# Patient Record
Sex: FEMALE | Race: BLACK OR AFRICAN AMERICAN | NOT HISPANIC OR LATINO | URBAN - METROPOLITAN AREA
[De-identification: names, ages, dates, MRNs, and addresses within clinical notes are randomized per-mention and may not be internally consistent; named-entity substitution may affect disease eponyms.]

---

## 2022-10-31 ENCOUNTER — EMERGENCY (EMERGENCY)
Facility: HOSPITAL | Age: 52
LOS: 0 days | Discharge: ANOTHER HOSPITAL | End: 2022-11-01
Attending: STUDENT IN AN ORGANIZED HEALTH CARE EDUCATION/TRAINING PROGRAM

## 2022-10-31 VITALS
HEART RATE: 106 BPM | HEIGHT: 65 IN | WEIGHT: 138.89 LBS | OXYGEN SATURATION: 98 % | TEMPERATURE: 98 F | DIASTOLIC BLOOD PRESSURE: 89 MMHG | SYSTOLIC BLOOD PRESSURE: 129 MMHG | RESPIRATION RATE: 18 BRPM

## 2022-10-31 DIAGNOSIS — D64.9 ANEMIA, UNSPECIFIED: ICD-10-CM

## 2022-10-31 DIAGNOSIS — F25.0 SCHIZOAFFECTIVE DISORDER, BIPOLAR TYPE: ICD-10-CM

## 2022-10-31 DIAGNOSIS — F22 DELUSIONAL DISORDERS: ICD-10-CM

## 2022-10-31 DIAGNOSIS — Z20.822 CONTACT WITH AND (SUSPECTED) EXPOSURE TO COVID-19: ICD-10-CM

## 2022-10-31 LAB
ANION GAP SERPL CALC-SCNC: 8 MMOL/L — SIGNIFICANT CHANGE UP (ref 5–17)
APAP SERPL-MCNC: < 2 UG/ML (ref 10–30)
BASOPHILS # BLD AUTO: 0.03 K/UL — SIGNIFICANT CHANGE UP (ref 0–0.2)
BASOPHILS NFR BLD AUTO: 0.6 % — SIGNIFICANT CHANGE UP (ref 0–2)
BUN SERPL-MCNC: 13 MG/DL — SIGNIFICANT CHANGE UP (ref 7–23)
CALCIUM SERPL-MCNC: 8.5 MG/DL — SIGNIFICANT CHANGE UP (ref 8.5–10.1)
CHLORIDE SERPL-SCNC: 105 MMOL/L — SIGNIFICANT CHANGE UP (ref 96–108)
CO2 SERPL-SCNC: 26 MMOL/L — SIGNIFICANT CHANGE UP (ref 22–31)
CREAT SERPL-MCNC: 0.88 MG/DL — SIGNIFICANT CHANGE UP (ref 0.5–1.3)
EGFR: 80 ML/MIN/1.73M2 — SIGNIFICANT CHANGE UP
EOSINOPHIL # BLD AUTO: 0.11 K/UL — SIGNIFICANT CHANGE UP (ref 0–0.5)
EOSINOPHIL NFR BLD AUTO: 2.1 % — SIGNIFICANT CHANGE UP (ref 0–6)
ETHANOL SERPL-MCNC: <10 MG/DL — SIGNIFICANT CHANGE UP (ref 0–10)
FLUAV AG NPH QL: SIGNIFICANT CHANGE UP
FLUBV AG NPH QL: SIGNIFICANT CHANGE UP
GLUCOSE SERPL-MCNC: 88 MG/DL — SIGNIFICANT CHANGE UP (ref 70–99)
HCG SERPL-ACNC: <1 MIU/ML — SIGNIFICANT CHANGE UP
HCT VFR BLD CALC: 30.4 % — LOW (ref 34.5–45)
HGB BLD-MCNC: 9.6 G/DL — LOW (ref 11.5–15.5)
IMM GRANULOCYTES NFR BLD AUTO: 0.2 % — SIGNIFICANT CHANGE UP (ref 0–0.9)
LYMPHOCYTES # BLD AUTO: 1.37 K/UL — SIGNIFICANT CHANGE UP (ref 1–3.3)
LYMPHOCYTES # BLD AUTO: 26.4 % — SIGNIFICANT CHANGE UP (ref 13–44)
MCHC RBC-ENTMCNC: 28 PG — SIGNIFICANT CHANGE UP (ref 27–34)
MCHC RBC-ENTMCNC: 31.6 G/DL — LOW (ref 32–36)
MCV RBC AUTO: 88.6 FL — SIGNIFICANT CHANGE UP (ref 80–100)
MONOCYTES # BLD AUTO: 0.41 K/UL — SIGNIFICANT CHANGE UP (ref 0–0.9)
MONOCYTES NFR BLD AUTO: 7.9 % — SIGNIFICANT CHANGE UP (ref 2–14)
NEUTROPHILS # BLD AUTO: 3.26 K/UL — SIGNIFICANT CHANGE UP (ref 1.8–7.4)
NEUTROPHILS NFR BLD AUTO: 62.8 % — SIGNIFICANT CHANGE UP (ref 43–77)
NRBC # BLD: 0 /100 WBCS — SIGNIFICANT CHANGE UP (ref 0–0)
PLATELET # BLD AUTO: 324 K/UL — SIGNIFICANT CHANGE UP (ref 150–400)
POTASSIUM SERPL-MCNC: 3.4 MMOL/L — LOW (ref 3.5–5.3)
POTASSIUM SERPL-SCNC: 3.4 MMOL/L — LOW (ref 3.5–5.3)
RBC # BLD: 3.43 M/UL — LOW (ref 3.8–5.2)
RBC # FLD: 14.1 % — SIGNIFICANT CHANGE UP (ref 10.3–14.5)
SALICYLATES SERPL-MCNC: <1.7 MG/DL — LOW (ref 2.8–20)
SARS-COV-2 RNA SPEC QL NAA+PROBE: SIGNIFICANT CHANGE UP
SODIUM SERPL-SCNC: 139 MMOL/L — SIGNIFICANT CHANGE UP (ref 135–145)
WBC # BLD: 5.19 K/UL — SIGNIFICANT CHANGE UP (ref 3.8–10.5)
WBC # FLD AUTO: 5.19 K/UL — SIGNIFICANT CHANGE UP (ref 3.8–10.5)

## 2022-10-31 PROCEDURE — 90792 PSYCH DIAG EVAL W/MED SRVCS: CPT | Mod: 95

## 2022-10-31 PROCEDURE — 93010 ELECTROCARDIOGRAM REPORT: CPT

## 2022-10-31 PROCEDURE — 99285 EMERGENCY DEPT VISIT HI MDM: CPT

## 2022-10-31 PROCEDURE — 70450 CT HEAD/BRAIN W/O DYE: CPT | Mod: 26,MA

## 2022-10-31 RX ORDER — HALOPERIDOL DECANOATE 100 MG/ML
5 INJECTION INTRAMUSCULAR ONCE
Refills: 0 | Status: COMPLETED | OUTPATIENT
Start: 2022-10-31 | End: 2022-10-31

## 2022-10-31 RX ADMIN — HALOPERIDOL DECANOATE 5 MILLIGRAM(S): 100 INJECTION INTRAMUSCULAR at 16:50

## 2022-10-31 RX ADMIN — Medication 2 MILLIGRAM(S): at 16:51

## 2022-10-31 NOTE — ED ADULT NURSE REASSESSMENT NOTE - NS ED NURSE REASSESS COMMENT FT1
pt refusing to have blood work done at this time, MD Cortez made aware, pt continues to be one to one, safety precautions are in place, nursing care continues

## 2022-10-31 NOTE — ED ADULT NURSE NOTE - ED STAT RN HANDOFF DETAILS
Report endorsed to oncoming RNJana. Safety checks completed this shift. Safety rounds completed hourly.  IV sites checked Q2+remains WDL. Medications administered as ordered with no s/s of adverse rxns. Fall & skin precautions in place. Any issues endorsed to oncoming RN for follow up.

## 2022-10-31 NOTE — ED PROVIDER NOTE - PHYSICAL EXAMINATION
Gen: NAD, AOx3, able to make needs known, non-toxic  Head: NC, approx 3 cm diameter chronic wound to R frontal w/o drainage or surrounding erythema  HEENT: EOMI, oral mucosa moist, normal conjunctiva  Lung: CTAB, no respiratory distress, no wheezes/rhonchi/rales B/L, speaking in full sentences  CV: RRR, no murmurs  Abd: non distended, soft, nontender, no guarding,  MSK: no visible deformities  Neuro: Appears non focal  Skin: Warm, well perfused  Psych: poor eye contact, flight of ideas, tangential speech, disheveled, intermittently yelling

## 2022-10-31 NOTE — ED ADULT NURSE REASSESSMENT NOTE - NS ED NURSE REASSESS COMMENT FT1
Pt sleeping comfortably in bed, easy to arouse, AAOx4 when awake. No complaints at this time. Respirations equal and unlabored. No acute distress noted at this time. Awaiting consult with telepsych at this time.

## 2022-10-31 NOTE — ED ADULT TRIAGE NOTE - NS ED NURSE BANDS TYPE
Pharyngitis in Children   WHAT YOU NEED TO KNOW:   Pharyngitis, or sore throat, is inflammation of the tissues and structures in your child's pharynx (throat)  Pharyngitis may be caused by a bacterial or viral infection  DISCHARGE INSTRUCTIONS:   Seek care immediately if:   Your child suddenly has trouble breathing or turns blue  Your child has swelling or pain in his or her jaw  Your child has voice changes, or it is hard to understand his or her speech  Your child has a stiff neck  Your child is urinating less than usual or has fewer diapers than usual      Your child has increased weakness or fatigue  Your child has pain on one side of the throat that is much worse than the other side  Contact your child's healthcare provider if:   Your child's symptoms return or his symptoms do not get better or get worse  Your child has a rash  He or she may also have reddish cheeks and a red, swollen tongue  Your child has new ear pain, headaches, or pain around his or her eyes  Your child pauses in breathing when he or she sleeps  You have questions or concerns about your child's condition or care  Medicines: Your child may need any of the following:  Acetaminophen  decreases pain  It is available without a doctor's order  Ask how much to give your child and how often to give it  Follow directions  Acetaminophen can cause liver damage if not taken correctly  NSAIDs , such as ibuprofen, help decrease swelling, pain, and fever  This medicine is available with or without a doctor's order  NSAIDs can cause stomach bleeding or kidney problems in certain people  If your child takes blood thinner medicine, always ask if NSAIDs are safe for him  Always read the medicine label and follow directions  Do not give these medicines to children under 10months of age without direction from your child's healthcare provider  Antibiotics  treat a bacterial infection      Do not give aspirin to children under 25years of age  Your child could develop Reye syndrome if he takes aspirin  Reye syndrome can cause life-threatening brain and liver damage  Check your child's medicine labels for aspirin, salicylates, or oil of wintergreen  Give your child's medicine as directed  Contact your child's healthcare provider if you think the medicine is not working as expected  Tell him or her if your child is allergic to any medicine  Keep a current list of the medicines, vitamins, and herbs your child takes  Include the amounts, and when, how, and why they are taken  Bring the list or the medicines in their containers to follow-up visits  Carry your child's medicine list with you in case of an emergency  Manage your child's pharyngitis:   Have your child rest  as much as possible  Give your child plenty of liquids  so he or she does not get dehydrated  Give your child liquids that are easy to swallow and will soothe his or her throat  Soothe your child's throat  If your child can gargle, give him or her ¼ of a teaspoon of salt mixed with 1 cup of warm water to gargle  If your child is 12 years or older, give him or her throat lozenges to help decrease throat pain  Use a cool mist humidifier  to increase air moisture in your home  This may make it easier for your child to breathe and help decrease his or her cough  Help prevent the spread of pharyngitis:  Wash your hands and your child's hands often  Keep your child away from other people while he or she is still contagious  Ask your child's healthcare provider how long your child is contagious  Do not let your child share food or drinks  Do not let your child share toys or pacifiers  Wash these items with soap and hot water  When to return to school or : Your child may return to  or school when his or her symptoms go away    Follow up with your child's healthcare provider as directed:  Write down your questions so you remember to ask them during your child's visits  © 2017 [employer] Information is for End User's use only and may not be sold, redistributed or otherwise used for commercial purposes  All illustrations and images included in CareNotes® are the copyrighted property of A [employer]  or Paulo Dias  The above information is an  only  It is not intended as medical advice for individual conditions or treatments  Talk to your doctor, nurse or pharmacist before following any medical regimen to see if it is safe and effective for you  Name band;

## 2022-10-31 NOTE — ED BEHAVIORAL HEALTH ASSESSMENT NOTE - RISK ASSESSMENT
Low Acute Suicide Risk RF: non compliance, psychotic sx,      Protective: no SA, no self harm, no SI     Risk Mitigated: admission to inpatient unit

## 2022-10-31 NOTE — ED PROVIDER NOTE - PROGRESS NOTE DETAILS
Attending Diego: pt not allowing staff to obtain blood work after multiple requests. Will give haldol in order to obtain blood work so psych can be consulted. Attending Diego: pt not allowing staff to obtain blood work after multiple requests. Will give haldol and ativan in order to obtain blood work so psych can be consulted. Attending Diego: MAYRA/RACH provided additional information to triage RN. Pt apparently had been missing from home for 3 days.  had called for help when she came home. Wound on head reportedly not there prior to pt leaving the house. Will obtain CTH. Attending Diego: signed out to evening attending pending CT results and labs for psych consult.

## 2022-10-31 NOTE — ED ADULT NURSE NOTE - SUICIDE SCREENING QUESTION 3
Plan: No treatment needed. There is an optical illusion, caused by the longitudinal reaching of patients toenails, that in certain lights almost appears like a faint brown longitudinal streak. I have reassured the patient that with contact and non-contact dermoscopy, polarized and non-polarized, there is no evidence of a melanocytic lesion or streak.  Photo, will recheck 2 months. Detail Level: Simple Patient unable to complete

## 2022-10-31 NOTE — ED ADULT TRIAGE NOTE - CHIEF COMPLAINT QUOTE
patient BIBA for evaluation wound forehead " since this summer" as per patient , patient has a very poor hygiene, denied  headache patient BIBA for evaluation wound forehead " since this summer" as per patient , patient has a very poor hygiene, denied  headache, mild  agitated at the time of triage

## 2022-10-31 NOTE — ED BEHAVIORAL HEALTH ASSESSMENT NOTE - SUMMARY
50 yo female, currently domiciled reports being domiciled with roommate with pphx of Schizoaffective Disorder and pmh of chronic anemia that presented due to EMS due to wound check and found to be making bizarre statements and in poor ability to care for self. To note patient has multiple hospitalization (last about 1 month ago per roommate), no previous SA, no previous cutting behaviors , no reported legal or drug use.     Diagnostically with presentation and collateral information, patient presentation is consistent with SAFD given recent change in behavior, disappearing for 2 weeks, and bizarre, paranoid behavior. Patient is illogical and makes bizarre statements. Patient is disheveled and does not appear to be taking care of her self needs. At this time patient meets involuntary hospitalization requirements due to inability to care for self due to her mental illness.      RF: non compliance, psychotic sx,    Protective: no SA, no self harm, no SI   Risk Mitigated: admission to inpatient unit     Plan:    -Admit 9.27, awaiting bed  -Recommend obtain VPA level   -Continue Home Medication which includes: start Risperidone 2mg qHS   -PRNs: Haldol 5/Ativan 2/Benadryl 50mg q6hr prn agitation; Hydroxyzine 25mg q6hr prn anxiety; Tylenol 650mg q6hr pain     -Labs:   ---CBC: unremarkable   ---CMP: K 3.4   ---UPT: negative   ---EKG: completed   ---UDS: pending   ---BAL: negative   ---CTH: negative   -COVID: Screen:-denied; PCR-negative; Vaccine Status-reports 1 vaccine

## 2022-10-31 NOTE — ED PROVIDER NOTE - CLINICAL SUMMARY MEDICAL DECISION MAKING FREE TEXT BOX
50 y/o M w/ PMH as above presenting w/ psych eval. Pt overall no acute distress. Pt acting bizarrely, making strange statements. Concerned for acute psychotic decompensated state. Also w/ wound on forehead, unclear when this occurred as pt is not reliable historian. Will require labs, imaging, EKG, and psych consult. Suspect pt will require admission for psychiatric management. Will reassess the need for additional interventions as clinically warranted.

## 2022-10-31 NOTE — ED BEHAVIORAL HEALTH ASSESSMENT NOTE - HPI (INCLUDE ILLNESS QUALITY, SEVERITY, DURATION, TIMING, CONTEXT, MODIFYING FACTORS, ASSOCIATED SIGNS AND SYMPTOMS)
50 yo female, currently domiciled reports being domiciled with roommate with pphx of Schizoaffective Disorder and pmh of chronic anemia that presented due to EMS due to wound check and found to be making bizarre statements and in poor ability to care for self. To note patient has multiple hospitalization (last about 1 month ago per roommate), no previous SA, no previous cutting behaviors , no reported legal or drug use.     Patient received haldol and ativan in ED.      On exam, patient is sleepy and has to be awoken many time. She makes bizarre statements about kids in her neighborhood and her head draining something due to the sun. She is very hard to understand at times on video monitor but she is illogical, paranoid, and makes bizarre statements. She states she is taking Depakote and Risperidone (unable to report dosing). She denies SI/Hi/AVH. She consents to talking to her room-mate.      Per Roommate, Bart: [phone]   -She got lost for about 2 weeks, family didn’t know where she was at; patient calling from phone kiosk and refused to tell where she was; finally told roommate whom picked her in [place] after calling (4 days ago); scarf on head with big gash and patient wouldn’t tell her how she got the gash   -Per roommmate patient has been more paranoid and acting strange; hyperverbal and not making sense; mentioning people that he isn’t aware of; he is concerned for AH; denies drugs or regular alcohol use of patient; not taking medication and stops taking medication as soon as she is discharged from the hospital.    -20x hospitalization      Per ED Note: “Reports police have been in her house and that has been making her angry. Pt w/ rapid speech and muttering regarding police, unclear exactly what she is saying. States the sun burned a hole in her forehead this summer. Reports children in the neighborhood pulled out 19 cue tips from her ears yesterday. Also reports being allergic to strawberries when she tries to pick them in the summer. Denies taking medications. States she used to see a Dr. Norris out of [facility] but no longer.”

## 2022-10-31 NOTE — ED ADULT NURSE NOTE - CHIEF COMPLAINT QUOTE
patient BIBA for evaluation wound forehead " since this summer" as per patient , patient has a very poor hygiene, denied  headache, mild  agitated at the time of triage

## 2022-10-31 NOTE — ED BEHAVIORAL HEALTH ASSESSMENT NOTE - OTHER PAST PSYCHIATRIC HISTORY (INCLUDE DETAILS REGARDING ONSET, COURSE OF ILLNESS, INPATIENT/OUTPATIENT TREATMENT)
Previous Dx: SAFD   Previous Med Trials: Clozapine, Prolixin depakote   Previous IP treatment: DC from [facility] on 10/18/06; [facility] for Schizoaffective Disorder on 8/18/22   Previous SA: denies   Cutting: denies   Current  treatment: Dr. Kilgore? And therapist unable to determine; per roommate none   Violence/Legal: denies

## 2022-10-31 NOTE — ED PROVIDER NOTE - OBJECTIVE STATEMENT
52 y/o F w/ PMH of paranoid schizophrenia presenting w/ wound check. Pt BIBEMS. Reports police have been in her house and that has been making her angry. Pt w/ rapid speech and muttering regarding police, unclear exactly what she is saying. States the sun burned a hole in her forehead this summer. 50 y/o F w/ PMH of paranoid schizophrenia presenting w/ wound check. Pt BIBEMS. Reports police have been in her house and that has been making her angry. Pt w/ rapid speech and muttering regarding police, unclear exactly what she is saying. States the sun burned a hole in her forehead this summer. Reports children in the neighborhood pulled out 19 cue tips from her ears yesterday. Also reports being allergic to strawberries when she tries to pick them in the summer. Denies taking medications. States she used to see a Dr. Norris out of [facility] but no longer.

## 2022-11-01 ENCOUNTER — INPATIENT (INPATIENT)
Facility: HOSPITAL | Age: 52
LOS: 30 days | Discharge: HOME | DRG: 885 | End: 2022-12-02
Attending: PSYCHIATRY & NEUROLOGY | Admitting: PSYCHIATRY & NEUROLOGY
Payer: MEDICARE

## 2022-11-01 VITALS
TEMPERATURE: 98 F | SYSTOLIC BLOOD PRESSURE: 114 MMHG | OXYGEN SATURATION: 100 % | DIASTOLIC BLOOD PRESSURE: 74 MMHG | RESPIRATION RATE: 19 BRPM | HEART RATE: 99 BPM

## 2022-11-01 VITALS
SYSTOLIC BLOOD PRESSURE: 110 MMHG | HEART RATE: 105 BPM | DIASTOLIC BLOOD PRESSURE: 74 MMHG | RESPIRATION RATE: 17 BRPM | OXYGEN SATURATION: 97 % | TEMPERATURE: 98 F

## 2022-11-01 RX ORDER — RISPERIDONE 4 MG/1
2 TABLET ORAL AT BEDTIME
Refills: 0 | Status: DISCONTINUED | OUTPATIENT
Start: 2022-11-01 | End: 2022-11-07

## 2022-11-01 RX ORDER — HYDROXYZINE HCL 10 MG
50 TABLET ORAL EVERY 6 HOURS
Refills: 0 | Status: DISCONTINUED | OUTPATIENT
Start: 2022-11-01 | End: 2022-12-02

## 2022-11-01 RX ORDER — DIPHENHYDRAMINE HCL 50 MG
50 CAPSULE ORAL EVERY 6 HOURS
Refills: 0 | Status: DISCONTINUED | OUTPATIENT
Start: 2022-11-01 | End: 2022-12-02

## 2022-11-01 RX ORDER — HALOPERIDOL DECANOATE 100 MG/ML
5 INJECTION INTRAMUSCULAR EVERY 6 HOURS
Refills: 0 | Status: DISCONTINUED | OUTPATIENT
Start: 2022-11-01 | End: 2022-12-02

## 2022-11-01 RX ADMIN — RISPERIDONE 2 MILLIGRAM(S): 4 TABLET ORAL at 21:15

## 2022-11-01 NOTE — ED ADULT NURSE REASSESSMENT NOTE - NS ED NURSE REASSESS COMMENT FT1
Pt sleeping comfortably in bed, easy to arouse, AAOx4 when awake. No complaints at this time. Respirations equal and unlabored. No acute distress noted at this time. Awaiting EMS pickup to transfer to [facility] at this time. Pt aware of transfer and verbalizes understanding.

## 2022-11-01 NOTE — BH SOCIAL WORK INITIAL PSYCHOSOCIAL EVALUATION - OTHER PAST PSYCHIATRIC HISTORY (INCLUDE DETAILS REGARDING ONSET, COURSE OF ILLNESS, INPATIENT/OUTPATIENT TREATMENT)
Previous Dx: SAFD   Previous Med Trials: Clozapine, Prolixin depakote   Previous IP treatment: DC from [facility] on 10/18/06; [facility] for Schizoaffective Disorder on 8/18/22   Previous SA: denies   Cutting: denies   Current  treatment: Dr. Kilgore? And therapist unable to determine; per roommate none   Violence/Legal: denies

## 2022-11-01 NOTE — BH CHART NOTE - NSEVENTNOTEFT_PSY_ALL_CORE
50 yo female, currently domiciled reports being domiciled with roommate with pphx of Schizoaffective Disorder and pmh of chronic anemia that presented due to EMS due to wound check and found to be making bizarre statements and in poor ability to care for self. To note patient has multiple hospitalization (last about 1 month ago per roommate), no previous SA, no previous cutting behaviors , no reported legal or drug use.       Patient seen shortly after admission, noted to be quite malodorous, she immediately began using the phone on presentation. Noted to be suspicious, paranoid. Has head wrap on her head due to her oralia and a 'scratch' on her forehead that she states occurred after she was 'scorched' by the sun. Refuses to allow writer to see her wound or perform physical assessment. She endorses having schizophrenia, but doesn't know what medications she takes. She demands to be discharged on Nov 8. Risperdal 2mg qhs initiated. No reported si/h/avh or PI per pt.

## 2022-11-01 NOTE — BH PATIENT PROFILE - FALL HARM RISK - UNIVERSAL INTERVENTIONS
Instruct patient to call for assistance before getting out of bed or chair/Non-slip footwear when patient is out of bed/Physically safe environment - no spills, clutter or unnecessary equipment/Purposeful Proactive Rounding

## 2022-11-02 PROCEDURE — 99223 1ST HOSP IP/OBS HIGH 75: CPT

## 2022-11-02 RX ORDER — DIVALPROEX SODIUM 500 MG/1
500 TABLET, DELAYED RELEASE ORAL AT BEDTIME
Refills: 0 | Status: DISCONTINUED | OUTPATIENT
Start: 2022-11-02 | End: 2022-11-03

## 2022-11-02 RX ADMIN — HALOPERIDOL DECANOATE 5 MILLIGRAM(S): 100 INJECTION INTRAMUSCULAR at 11:07

## 2022-11-02 RX ADMIN — RISPERIDONE 2 MILLIGRAM(S): 4 TABLET ORAL at 21:42

## 2022-11-02 RX ADMIN — DIVALPROEX SODIUM 500 MILLIGRAM(S): 500 TABLET, DELAYED RELEASE ORAL at 21:43

## 2022-11-02 RX ADMIN — Medication 50 MILLIGRAM(S): at 11:08

## 2022-11-02 RX ADMIN — Medication 2 MILLIGRAM(S): at 11:08

## 2022-11-02 NOTE — BH INPATIENT PSYCHIATRY ASSESSMENT NOTE - HPI (INCLUDE ILLNESS QUALITY, SEVERITY, DURATION, TIMING, CONTEXT, MODIFYING FACTORS, ASSOCIATED SIGNS AND SYMPTOMS)
This is a 50 yo  female unemployed, unmarried, reports to be currently domiciled with roommate. Medical hx significant wound on forehead and chronic anemia. Psychiatric hx pertinent for Schizoaffective Disorder, prior hospitalizations (last was 1 month ago per roommate). No known suicide attempts, no legal hx, no drug use. Patient presents to ED via EMS for a wound check and was noted to be making bizarre statements and in poor ability to care for self. Received prns of haldol and ativan while in ED. Patient transferred to [facility] and admitted 2pc.       Patient noted to be malodorous and irritable on approach, quite disorganized and unable to discuss what brought her into the hospital. She does state that she has schizoaffective disorder and takes medications, but doesn't know which ones. Denies si/hi/avh or PI. Speaks about getting a spot on her forehead due to being scorched by the sun, but refuses to allow writer to see her wound. Noted to be aggressive at times towards treatment team stating that she recognized certain staff members from another hospitalization and didn't trust them. Refused to answer further questions stating that she could only give 2 minutes of her time. Later in afternoon pt did receive po prns of haldol 5mg/ativan 2mg/benadryl 50mg due to paranoia and verbal aggression.     Per Roommate, Bart: [phone]   -She got lost for about 2 weeks, family didn’t know where she was at; patient calling from phone kiosk and refused to tell where she was; finally told roommate whom picked her in [place] after calling (4 days ago); scarf on head with big gash and patient wouldn’t tell her how she got the gash   -Per roommmate patient has been more paranoid and acting strange; hyperverbal and not making sense; mentioning people that he isn’t aware of; he is concerned for AH; denies drugs or regular alcohol use of patient; not taking medication and stops taking medication as soon as she is discharged from the hospital.    -20x hospitalization      Per ED Note: “Reports police have been in her house and that has been making her angry. Pt w/ rapid speech and muttering regarding police, unclear exactly what she is saying. States the sun burned a hole in her forehead this summer. Reports children in the neighborhood pulled out 19 cue tips from her ears yesterday. Also reports being allergic to strawberries when she tries to pick them in the summer. Denies taking medications. States she used to see a Dr. Norris out of [facility] but no longer.”

## 2022-11-02 NOTE — BH INPATIENT PSYCHIATRY ASSESSMENT NOTE - NSBHCHARTREVIEWVS_PSY_A_CORE FT
Vital Signs Last 24 Hrs  T(C): 37 (11-02-22 @ 08:28), Max: 37 (11-02-22 @ 08:28)  T(F): 98.6 (11-02-22 @ 08:28), Max: 98.6 (11-02-22 @ 08:28)  HR: 101 (11-02-22 @ 08:28) (101 - 105)  BP: 111/71 (11-02-22 @ 08:28) (110/74 - 111/71)  BP(mean): --  RR: 18 (11-02-22 @ 08:28) (17 - 18)  SpO2: 100% (11-02-22 @ 08:28) (97% - 100%)

## 2022-11-02 NOTE — BH INPATIENT PSYCHIATRY ASSESSMENT NOTE - NSBHCHARTREVIEWINVESTIGATE_PSY_A_CORE FT
ACC: [id number] EXAM:  CT BRAIN                          PROCEDURE DATE:  10/31/2022          INTERPRETATION:  INDICATION:  Status post trauma with head injury.     Headache.  TECHNIQUE:  A non contrast 2.5mm axial CT study of the brain was   performed from skull base to vertex. Coronal and sagittal reformations   were generated from the axial data.  COMPARISON EXAMINATION:  no prior.    FINDINGS:    HEMISPHERES:No acute intracerebral hemorrhage or contusion is identified.    No mass or space occupying lesion is noted.  No acute ischemic changes   are suggested.  VENTRICLES:  Midline and normal in size.  POSTERIOR FOSSA:  The brain stem and cerebellum are unremarkable.  No CP   angle lesion noted.  EXTRACEREBRAL SPACES:  No subdural or epidural collections are noted.  SKULL BASE AND CALVARIUM:  Appears intact.  No fracture or destructive   lesion is identified.  SINUSES AND MASTOIDS:  Clear.  MISCELLANEOUS:  No orbital or suprasellar abnormality noted.    IMPRESSION:    1)  unremarkable CT study of the brain..  2)  no intracerebral hemorrhage, contusion, or acute hemorrhagic   extracerebral collections are identified.    --- End of Report ---            JOSE LOPEZ MD; Attending Radiologist

## 2022-11-02 NOTE — BH INPATIENT PSYCHIATRY ASSESSMENT NOTE - NSTXDCOPLKGOALOTHER_PSY_ALL_CORE
Patient will explore psychosocial issues and barriers to discharge with  while engaging in discharge planning for 30 minutes per week.

## 2022-11-02 NOTE — BH INPATIENT PSYCHIATRY ASSESSMENT NOTE - OTHER PAST PSYCHIATRIC HISTORY (INCLUDE DETAILS REGARDING ONSET, COURSE OF ILLNESS, INPATIENT/OUTPATIENT TREATMENT)
Previous Dx: SAFD   Previous Med Trials: Clozapine, Prolixin depakote   Previous IP treatment: DC from [facility] on 10/18/06; [facility] for Schizoaffective Disorder on 8/18/22   Previous SA: denies   Cutting: denies   Current  treatment: Dr. Kilgore? And therapist unable to determine; per roommate none   Violence/Legal: denies

## 2022-11-02 NOTE — BH INPATIENT PSYCHIATRY ASSESSMENT NOTE - CURRENT MEDICATION
MEDICATIONS  (STANDING):  risperiDONE   Tablet 2 milliGRAM(s) Oral at bedtime    MEDICATIONS  (PRN):  diphenhydrAMINE 50 milliGRAM(s) Oral every 6 hours PRN Rash and/or Itching  haloperidol     Tablet 5 milliGRAM(s) Oral every 6 hours PRN agitation  hydrOXYzine hydrochloride 50 milliGRAM(s) Oral every 6 hours PRN Anxiety  LORazepam     Tablet 2 milliGRAM(s) Oral every 6 hours PRN Agitation

## 2022-11-02 NOTE — BH INPATIENT PSYCHIATRY ASSESSMENT NOTE - NSBHASSESSSUMMFT_PSY_ALL_CORE
This is a 52 yo  female unemployed, unmarried, reports to be currently domiciled with roommate. Medical hx significant wound on forehead and chronic anemia. Psychiatric hx pertinent for Schizoaffective Disorder, prior hospitalizations (last was 1 month ago per roommate). No known suicide attempts, no legal hx, no drug use. Patient presents to ED via EMS for a wound check and was noted to be making bizarre statements and in poor ability to care for self. Received prns of haldol and ativan while in ED. Patient transferred to [facility] and admitted 2pc.       Risperdal 2mg qhs initiated, will continue to titrate as necessary

## 2022-11-02 NOTE — BH INPATIENT PSYCHIATRY ASSESSMENT NOTE - NSBHMETABOLIC_PSY_ALL_CORE_FT
BMI: BMI (kg/m2): 23.1 (11-01-22 @ 01:23)  HbA1c:   Glucose:   BP: 111/71 (11-02-22 @ 08:28) (110/74 - 111/71)  Lipid Panel:

## 2022-11-02 NOTE — BH INPATIENT PSYCHIATRY ASSESSMENT NOTE - RISK ASSESSMENT
Static: mental illness, prior hospitalizations     Modifiable: current mood episode, access to care and treatment    Protective: help seeking

## 2022-11-02 NOTE — BH INPATIENT PSYCHIATRY ASSESSMENT NOTE - NSBHATTESTAPPBILLTIME_PSY_A_CORE
I attest my time as PANCHITO is greater than 50% of the total combined time spent on qualifying patient care activities. I have reviewed and verified the documentation.

## 2022-11-03 PROCEDURE — 99232 SBSQ HOSP IP/OBS MODERATE 35: CPT

## 2022-11-03 RX ORDER — RISPERIDONE 4 MG/1
1 TABLET ORAL DAILY
Refills: 0 | Status: DISCONTINUED | OUTPATIENT
Start: 2022-11-04 | End: 2022-11-07

## 2022-11-03 RX ORDER — DIVALPROEX SODIUM 500 MG/1
500 TABLET, DELAYED RELEASE ORAL
Refills: 0 | Status: DISCONTINUED | OUTPATIENT
Start: 2022-11-03 | End: 2022-11-10

## 2022-11-03 RX ADMIN — HALOPERIDOL DECANOATE 5 MILLIGRAM(S): 100 INJECTION INTRAMUSCULAR at 14:23

## 2022-11-03 RX ADMIN — DIVALPROEX SODIUM 500 MILLIGRAM(S): 500 TABLET, DELAYED RELEASE ORAL at 09:47

## 2022-11-03 RX ADMIN — Medication 2 MILLIGRAM(S): at 14:23

## 2022-11-03 NOTE — BH INPATIENT PSYCHIATRY PROGRESS NOTE - NSBHMETABOLIC_PSY_ALL_CORE_FT
BMI: BMI (kg/m2): 23.1 (11-01-22 @ 01:23)  HbA1c:   Glucose:   BP: 100/63 (11-03-22 @ 08:10) (100/63 - 111/71)  Lipid Panel:

## 2022-11-03 NOTE — BH INPATIENT PSYCHIATRY PROGRESS NOTE - CURRENT MEDICATION
MEDICATIONS  (STANDING):  diVALproex  milliGRAM(s) Oral two times a day  risperiDONE   Tablet 2 milliGRAM(s) Oral at bedtime    MEDICATIONS  (PRN):  diphenhydrAMINE 50 milliGRAM(s) Oral every 6 hours PRN Rash and/or Itching  haloperidol     Tablet 5 milliGRAM(s) Oral every 6 hours PRN agitation  hydrOXYzine hydrochloride 50 milliGRAM(s) Oral every 6 hours PRN Anxiety  LORazepam     Tablet 2 milliGRAM(s) Oral every 6 hours PRN Agitation

## 2022-11-03 NOTE — BH INPATIENT PSYCHIATRY PROGRESS NOTE - NSBHFUPINTERVALHXFT_PSY_A_CORE
Patient visible on the unit, seen doing exercises in front of the tv today. Irritable, dismissive and uncooperative with writer, stating "I don't want to talk to you!'  Per staff report pt remains malodorous, is taking prescribed medications, slept on and off last night. Remains disorganized and psychotic

## 2022-11-03 NOTE — BH INPATIENT PSYCHIATRY PROGRESS NOTE - PRN MEDS
MEDICATIONS  (PRN):  diphenhydrAMINE 50 milliGRAM(s) Oral every 6 hours PRN Rash and/or Itching  haloperidol     Tablet 5 milliGRAM(s) Oral every 6 hours PRN agitation  hydrOXYzine hydrochloride 50 milliGRAM(s) Oral every 6 hours PRN Anxiety  LORazepam     Tablet 2 milliGRAM(s) Oral every 6 hours PRN Agitation

## 2022-11-03 NOTE — BH INPATIENT PSYCHIATRY PROGRESS NOTE - NSBHASSESSSUMMFT_PSY_ALL_CORE
This is a 52 yo  female unemployed, unmarried, reports to be currently domiciled with roommate. Medical hx significant wound on forehead and chronic anemia. Psychiatric hx pertinent for Schizoaffective Disorder, prior hospitalizations (last was 1 month ago per roommate). No known suicide attempts, no legal hx, no drug use. Patient presents to ED via EMS for a wound check and was noted to be making bizarre statements and in poor ability to care for self. Received prns of haldol and ativan while in ED. Patient transferred to [facility] and admitted 2pc.       Risperdal increased to 1mg qd/ 2mg qhs   Depakote 500mg bid

## 2022-11-03 NOTE — BH INPATIENT PSYCHIATRY PROGRESS NOTE - NSBHCHARTREVIEWVS_PSY_A_CORE FT
Vital Signs Last 24 Hrs  T(C): 36.8 (11-03-22 @ 08:10), Max: 36.8 (11-03-22 @ 08:10)  T(F): 98.3 (11-03-22 @ 08:10), Max: 98.3 (11-03-22 @ 08:10)  HR: 109 (11-03-22 @ 08:10) (109 - 109)  BP: 100/63 (11-03-22 @ 08:10) (100/63 - 100/63)  BP(mean): --  RR: 18 (11-03-22 @ 08:10) (18 - 18)  SpO2: 100% (11-03-22 @ 08:10) (100% - 100%)

## 2022-11-04 PROCEDURE — 99231 SBSQ HOSP IP/OBS SF/LOW 25: CPT

## 2022-11-04 RX ADMIN — RISPERIDONE 2 MILLIGRAM(S): 4 TABLET ORAL at 21:33

## 2022-11-04 RX ADMIN — DIVALPROEX SODIUM 500 MILLIGRAM(S): 500 TABLET, DELAYED RELEASE ORAL at 21:33

## 2022-11-04 RX ADMIN — RISPERIDONE 1 MILLIGRAM(S): 4 TABLET ORAL at 10:13

## 2022-11-04 RX ADMIN — DIVALPROEX SODIUM 500 MILLIGRAM(S): 500 TABLET, DELAYED RELEASE ORAL at 10:12

## 2022-11-04 NOTE — BH INPATIENT PSYCHIATRY PROGRESS NOTE - PRN MEDS
MEDICATIONS  (PRN):  diphenhydrAMINE 50 milliGRAM(s) Oral every 6 hours PRN Rash and/or Itching  haloperidol     Tablet 5 milliGRAM(s) Oral every 6 hours PRN agitation  hydrOXYzine hydrochloride 50 milliGRAM(s) Oral every 6 hours PRN Anxiety  LORazepam     Tablet 2 milliGRAM(s) Oral every 6 hours PRN Agitation   MEDICATIONS  (PRN):  diphenhydrAMINE 50 milliGRAM(s) Oral every 6 hours PRN Rash and/or Itching  haloperidol     Tablet 5 milliGRAM(s) Oral every 6 hours PRN agitation  hydrOXYzine hydrochloride 50 milliGRAM(s) Oral every 6 hours PRN Anxiety

## 2022-11-04 NOTE — BH INPATIENT PSYCHIATRY PROGRESS NOTE - NSBHFUPINTERVALHXFT_PSY_A_CORE
Patient visible on the unit, seen doing exercises in front of the tv today. Irritable, dismissive and uncooperative with writer, stating "I don't want to talk to you!'  Per staff report pt remains malodorous, is taking prescribed medications, slept on and off last night. Remains disorganized and psychotic Pt friendly but utterly disorganized thought process. Unable to really hold a conversation. Denying side effects.

## 2022-11-04 NOTE — BH INPATIENT PSYCHIATRY PROGRESS NOTE - NSBHMETABOLIC_PSY_ALL_CORE_FT
BMI: BMI (kg/m2): 23.1 (11-01-22 @ 01:23)  HbA1c:   Glucose:   BP: 113/75 (11-04-22 @ 17:00) (100/63 - 123/85)  Lipid Panel:  BMI: BMI (kg/m2): 23.1 (11-01-22 @ 01:23)  HbA1c:   Glucose:   BP: 112/76 (11-09-22 @ 16:22) (106/73 - 132/87)  Lipid Panel:

## 2022-11-04 NOTE — BH INPATIENT PSYCHIATRY PROGRESS NOTE - NSBHCHARTREVIEWVS_PSY_A_CORE FT
Vital Signs Last 24 Hrs  T(C): 36.9 (11-04-22 @ 17:00), Max: 36.9 (11-04-22 @ 17:00)  T(F): 98.5 (11-04-22 @ 17:00), Max: 98.5 (11-04-22 @ 17:00)  HR: 102 (11-04-22 @ 17:00) (87 - 102)  BP: 113/75 (11-04-22 @ 17:00) (113/75 - 123/85)  BP(mean): --  RR: 18 (11-04-22 @ 17:00) (18 - 18)  SpO2: 99% (11-04-22 @ 17:00) (95% - 99%)     Vital Signs Last 24 Hrs  T(C): 36.9 (11-09-22 @ 16:22), Max: 36.9 (11-09-22 @ 16:22)  T(F): 98.4 (11-09-22 @ 16:22), Max: 98.4 (11-09-22 @ 16:22)  HR: 106 (11-09-22 @ 16:22) (96 - 106)  BP: 112/76 (11-09-22 @ 16:22) (106/73 - 112/76)  BP(mean): --  RR: 18 (11-09-22 @ 16:22) (18 - 18)  SpO2: 100% (11-09-22 @ 16:22) (98% - 100%)

## 2022-11-04 NOTE — BH INPATIENT PSYCHIATRY PROGRESS NOTE - NSBHASSESSSUMMFT_PSY_ALL_CORE
This is a 50 yo  female unemployed, unmarried, reports to be currently domiciled with roommate. Medical hx significant wound on forehead and chronic anemia. Psychiatric hx pertinent for Schizoaffective Disorder, prior hospitalizations (last was 1 month ago per roommate). No known suicide attempts, no legal hx, no drug use. Patient presents to ED via EMS for a wound check and was noted to be making bizarre statements and in poor ability to care for self. Received prns of haldol and ativan while in ED. Patient transferred to [facility] and admitted 2pc.       Risperdal increased to 1mg qd/ 2mg qhs   Depakote 500mg bid

## 2022-11-05 RX ORDER — HALOPERIDOL DECANOATE 100 MG/ML
5 INJECTION INTRAMUSCULAR ONCE
Refills: 0 | Status: COMPLETED | OUTPATIENT
Start: 2022-11-05 | End: 2022-11-05

## 2022-11-05 RX ORDER — DIPHENHYDRAMINE HCL 50 MG
50 CAPSULE ORAL ONCE
Refills: 0 | Status: COMPLETED | OUTPATIENT
Start: 2022-11-05 | End: 2022-11-05

## 2022-11-05 RX ADMIN — RISPERIDONE 1 MILLIGRAM(S): 4 TABLET ORAL at 09:59

## 2022-11-05 RX ADMIN — HALOPERIDOL DECANOATE 5 MILLIGRAM(S): 100 INJECTION INTRAMUSCULAR at 15:15

## 2022-11-05 RX ADMIN — DIVALPROEX SODIUM 500 MILLIGRAM(S): 500 TABLET, DELAYED RELEASE ORAL at 21:46

## 2022-11-05 RX ADMIN — Medication 2 MILLIGRAM(S): at 15:14

## 2022-11-05 RX ADMIN — DIVALPROEX SODIUM 500 MILLIGRAM(S): 500 TABLET, DELAYED RELEASE ORAL at 09:59

## 2022-11-05 RX ADMIN — Medication 50 MILLIGRAM(S): at 15:15

## 2022-11-05 RX ADMIN — RISPERIDONE 2 MILLIGRAM(S): 4 TABLET ORAL at 21:46

## 2022-11-05 NOTE — BH INPATIENT PSYCHIATRY PROGRESS NOTE - CURRENT MEDICATION
MEDICATIONS  (STANDING):  diVALproex  milliGRAM(s) Oral two times a day  risperiDONE   Tablet 2 milliGRAM(s) Oral at bedtime  risperiDONE   Tablet 1 milliGRAM(s) Oral daily    MEDICATIONS  (PRN):  diphenhydrAMINE 50 milliGRAM(s) Oral every 6 hours PRN Rash and/or Itching  haloperidol     Tablet 5 milliGRAM(s) Oral every 6 hours PRN agitation  hydrOXYzine hydrochloride 50 milliGRAM(s) Oral every 6 hours PRN Anxiety  LORazepam     Tablet 2 milliGRAM(s) Oral every 6 hours PRN Agitation   MEDICATIONS  (STANDING):  diphenhydrAMINE Injectable 50 milliGRAM(s) IntraMuscular once  diVALproex  milliGRAM(s) Oral two times a day  haloperidol    Injectable 5 milliGRAM(s) IntraMuscular once  LORazepam   Injectable 2 milliGRAM(s) IntraMuscular once  risperiDONE   Tablet 1 milliGRAM(s) Oral daily  risperiDONE   Tablet 2 milliGRAM(s) Oral at bedtime    MEDICATIONS  (PRN):  diphenhydrAMINE 50 milliGRAM(s) Oral every 6 hours PRN Rash and/or Itching  haloperidol     Tablet 5 milliGRAM(s) Oral every 6 hours PRN agitation  hydrOXYzine hydrochloride 50 milliGRAM(s) Oral every 6 hours PRN Anxiety  LORazepam     Tablet 2 milliGRAM(s) Oral every 6 hours PRN Agitation   MEDICATIONS  (STANDING):  diVALproex  milliGRAM(s) Oral two times a day  risperiDONE   Tablet 1 milliGRAM(s) Oral daily  risperiDONE   Tablet 2 milliGRAM(s) Oral at bedtime    MEDICATIONS  (PRN):  diphenhydrAMINE 50 milliGRAM(s) Oral every 6 hours PRN Rash and/or Itching  haloperidol     Tablet 5 milliGRAM(s) Oral every 6 hours PRN agitation  hydrOXYzine hydrochloride 50 milliGRAM(s) Oral every 6 hours PRN Anxiety  LORazepam     Tablet 2 milliGRAM(s) Oral every 6 hours PRN Agitation

## 2022-11-05 NOTE — BH INPATIENT PSYCHIATRY PROGRESS NOTE - NSBHFUPINTERVALHXFT_PSY_A_CORE
Chart and nursing notes reviewed.  No acute events overnight, VSS.  The patient has been interacting appropriately with staff and peers and has been compliant with medications.  The patient continues to tolerate medications and denies any medication side effects.    On evaluation, the patient is calm, cooperative, demonstrating good behavioral control and linear thought processes.      Patient visible on the unit, seen doing exercises in front of the tv today. Irritable, dismissive and uncooperative with writer, stating "I don't want to talk to you!'  Per staff report pt remains malodorous, is taking prescribed medications, slept on and off last night. Remains disorganized and psychotic Chart and nursing notes reviewed.  No acute events overnight, VSS.  The patient has been irritable and argumentative with staff.  Patient is not attending to ADLs and is malodorous.  Patient refusing to improve hygiene despite staff encouragement.     On evaluation, the patient is irritable, agitated, uncooperative, not verbally redirectable and demonstrating poor behavioral control and disorganized thought processes.  On approach, she is yelling at a staff member calling him a "bum" because she was encouraged to take a shower.  The patient is unable to de-escalate and haldol 5 mg IM, lorazepam 2 mg IM and diphenhydramine 50 mg IM ONCE is ordered for agitation.

## 2022-11-05 NOTE — BH INPATIENT PSYCHIATRY PROGRESS NOTE - NSBHMETABOLIC_PSY_ALL_CORE_FT
BMI: BMI (kg/m2): 23.1 (11-01-22 @ 01:23)  HbA1c:   Glucose:   BP: 131/72 (11-05-22 @ 09:53) (100/63 - 131/72)  Lipid Panel:

## 2022-11-05 NOTE — BH INPATIENT PSYCHIATRY PROGRESS NOTE - NSBHASSESSSUMMFT_PSY_ALL_CORE
This is a 50 yo  female unemployed, unmarried, reports to be currently domiciled with roommate. Medical hx significant wound on forehead and chronic anemia. Psychiatric hx pertinent for Schizoaffective Disorder, prior hospitalizations (last was 1 month ago per roommate). No known suicide attempts, no legal hx, no drug use. Patient presents to ED via EMS for a wound check and was noted to be making bizarre statements and in poor ability to care for self. Received prns of haldol and ativan while in ED. Patient transferred to [facility] and admitted 2pc.       Risperdal increased to 1mg qd/ 2mg qhs   Depakote 500mg bid  This is a 52 yo  female unemployed, unmarried, reports to be currently domiciled with roommate. Medical hx significant wound on forehead and chronic anemia. Psychiatric hx pertinent for Schizoaffective Disorder, prior hospitalizations (last was 1 month ago per roommate). No known suicide attempts, no legal hx, no drug use. Patient presents to ED via EMS for a wound check and was noted to be making bizarre statements and in poor ability to care for self. Received prns of haldol and ativan while in ED. Patient transferred to [facility] and admitted 2pc.     On evaluation, the patient is irritable, agitated, uncooperative, not verbally redirectable and demonstrating poor behavioral control and disorganized thought processes.  On approach, she is yelling at a staff member calling him a "bum" because she was encouraged to take a shower.  The patient is unable to de-escalate and haldol 5 mg IM, lorazepam 2 mg IM and diphenhydramine 50 mg IM ONCE is ordered for agitation.      Plan:   - continue Risperdal 2 mg PO qhs   - continue Depakote 500 mg PO BID  - haldol 5 mg PO/IM, lorazepam 2 mg PO/IM, diphenhydramine 50 mg PO/IM q6h PRN for agitation; hold for qtc >500 ms.

## 2022-11-05 NOTE — BH INPATIENT PSYCHIATRY PROGRESS NOTE - NSBHCHARTREVIEWVS_PSY_A_CORE FT
Vital Signs Last 24 Hrs  T(C): 36.4 (11-05-22 @ 09:53), Max: 36.9 (11-04-22 @ 17:00)  T(F): 97.6 (11-05-22 @ 09:53), Max: 98.5 (11-04-22 @ 17:00)  HR: 108 (11-05-22 @ 09:53) (102 - 108)  BP: 131/72 (11-05-22 @ 09:53) (113/75 - 131/72)  BP(mean): --  RR: 18 (11-05-22 @ 09:53) (18 - 18)  SpO2: 98% (11-05-22 @ 09:53) (98% - 99%)     Vital Signs Last 24 Hrs  T(C): 36.4 (11-05-22 @ 09:53), Max: 36.4 (11-05-22 @ 09:53)  T(F): 97.6 (11-05-22 @ 09:53), Max: 97.6 (11-05-22 @ 09:53)  HR: 108 (11-05-22 @ 09:53) (108 - 108)  BP: 131/72 (11-05-22 @ 09:53) (131/72 - 131/72)  BP(mean): --  RR: 18 (11-05-22 @ 09:53) (18 - 18)  SpO2: 98% (11-05-22 @ 09:53) (98% - 98%)

## 2022-11-06 RX ADMIN — RISPERIDONE 1 MILLIGRAM(S): 4 TABLET ORAL at 10:08

## 2022-11-06 RX ADMIN — DIVALPROEX SODIUM 500 MILLIGRAM(S): 500 TABLET, DELAYED RELEASE ORAL at 22:00

## 2022-11-06 RX ADMIN — DIVALPROEX SODIUM 500 MILLIGRAM(S): 500 TABLET, DELAYED RELEASE ORAL at 10:06

## 2022-11-06 RX ADMIN — RISPERIDONE 2 MILLIGRAM(S): 4 TABLET ORAL at 22:00

## 2022-11-06 RX ADMIN — Medication 2 MILLIGRAM(S): at 10:06

## 2022-11-06 RX ADMIN — HALOPERIDOL DECANOATE 5 MILLIGRAM(S): 100 INJECTION INTRAMUSCULAR at 10:06

## 2022-11-06 RX ADMIN — Medication 50 MILLIGRAM(S): at 10:06

## 2022-11-07 PROCEDURE — 99233 SBSQ HOSP IP/OBS HIGH 50: CPT

## 2022-11-07 RX ORDER — RISPERIDONE 4 MG/1
2 TABLET ORAL
Refills: 0 | Status: DISCONTINUED | OUTPATIENT
Start: 2022-11-07 | End: 2022-11-09

## 2022-11-07 RX ORDER — RISPERIDONE 4 MG/1
1 TABLET ORAL ONCE
Refills: 0 | Status: COMPLETED | OUTPATIENT
Start: 2022-11-07 | End: 2022-11-07

## 2022-11-07 RX ADMIN — DIVALPROEX SODIUM 500 MILLIGRAM(S): 500 TABLET, DELAYED RELEASE ORAL at 22:18

## 2022-11-07 RX ADMIN — RISPERIDONE 1 MILLIGRAM(S): 4 TABLET ORAL at 15:04

## 2022-11-07 RX ADMIN — DIVALPROEX SODIUM 500 MILLIGRAM(S): 500 TABLET, DELAYED RELEASE ORAL at 10:22

## 2022-11-07 RX ADMIN — RISPERIDONE 1 MILLIGRAM(S): 4 TABLET ORAL at 10:27

## 2022-11-07 RX ADMIN — RISPERIDONE 2 MILLIGRAM(S): 4 TABLET ORAL at 22:18

## 2022-11-07 NOTE — BH INPATIENT PSYCHIATRY PROGRESS NOTE - NSBHASSESSSUMMFT_PSY_ALL_CORE
This is a 52 yo  female unemployed, unmarried, reports to be currently domiciled with roommate. Medical hx significant wound on forehead and chronic anemia. Psychiatric hx pertinent for Schizoaffective Disorder, prior hospitalizations (last was 1 month ago per roommate). No known suicide attempts, no legal hx, no drug use. Patient presents to ED via EMS for a wound check and was noted to be making bizarre statements and in poor ability to care for self. Received prns of haldol and ativan while in ED. Patient transferred to [facility] and admitted 2pc.     On evaluation, the patient is irritable, agitated, uncooperative, not verbally redirectable and demonstrating poor behavioral control and disorganized thought processes.  On approach, she is yelling at a staff member calling him a "bum" because she was encouraged to take a shower.  The patient is unable to de-escalate and haldol 5 mg IM, lorazepam 2 mg IM and diphenhydramine 50 mg IM ONCE is ordered for agitation.      Plan:   - Risperdal 1mg qd/2 mg qhs  increased to 2mg bid  - continue Depakote 500 mg PO BID cmp/vpa ordered for 11/8  - haldol 5 mg PO/IM, lorazepam 2 mg PO/IM, diphenhydramine 50 mg PO/IM q6h PRN for agitation; hold for qtc >500 ms.    11/7 received IM  prns on 11/6 for agitation, risperdal to be increased to 2mg bid, depakote/cmp level pending for tomorrow am, remains uncooperative, irritable  and dismissive

## 2022-11-07 NOTE — BH INPATIENT PSYCHIATRY PROGRESS NOTE - NSBHCHARTREVIEWVS_PSY_A_CORE FT
Vital Signs Last 24 Hrs  T(C): 36.8 (11-07-22 @ 09:00), Max: 36.9 (11-06-22 @ 16:35)  T(F): 98.3 (11-07-22 @ 09:00), Max: 98.5 (11-06-22 @ 16:35)  HR: 95 (11-07-22 @ 09:00) (93 - 95)  BP: 123/72 (11-07-22 @ 09:00) (118/80 - 123/72)  BP(mean): --  RR: 18 (11-07-22 @ 09:00) (18 - 18)  SpO2: 100% (11-07-22 @ 09:00) (100% - 100%)

## 2022-11-07 NOTE — BH INPATIENT PSYCHIATRY PROGRESS NOTE - NSBHMETABOLIC_PSY_ALL_CORE_FT
BMI: BMI (kg/m2): 23.1 (11-01-22 @ 01:23)  HbA1c:   Glucose:   BP: 123/72 (11-07-22 @ 09:00) (113/75 - 133/98)  Lipid Panel:

## 2022-11-07 NOTE — BH INPATIENT PSYCHIATRY PROGRESS NOTE - CURRENT MEDICATION
MEDICATIONS  (STANDING):  diVALproex  milliGRAM(s) Oral two times a day  risperiDONE   Tablet 1 milliGRAM(s) Oral once  risperiDONE  Disintegrating Tablet 2 milliGRAM(s) Oral two times a day    MEDICATIONS  (PRN):  diphenhydrAMINE 50 milliGRAM(s) Oral every 6 hours PRN Rash and/or Itching  haloperidol     Tablet 5 milliGRAM(s) Oral every 6 hours PRN agitation  hydrOXYzine hydrochloride 50 milliGRAM(s) Oral every 6 hours PRN Anxiety  LORazepam     Tablet 2 milliGRAM(s) Oral every 6 hours PRN Agitation

## 2022-11-07 NOTE — BH INPATIENT PSYCHIATRY PROGRESS NOTE - NSBHFUPINTERVALHXFT_PSY_A_CORE
Patient seen in the tv area watching tv, uncooperative on approach 'I don't want to talk to you, talk to someone else' unwilling to engage in writer, she states that she submitted a 72 hour letter and needs to be discharged. Terminates interaction and walks away from writer.  Per staff report, pt remains irritable, unfriendly. She received IMs yesterday due to agitation.   Will plan to continue titration of meds, depakote level/cmp ordered for tomorrow am.

## 2022-11-08 LAB
ALBUMIN SERPL ELPH-MCNC: 3.2 G/DL — LOW (ref 3.3–5)
ALP SERPL-CCNC: 33 U/L — LOW (ref 40–120)
ALT FLD-CCNC: 10 U/L — SIGNIFICANT CHANGE UP (ref 10–45)
ANION GAP SERPL CALC-SCNC: 7 MMOL/L — SIGNIFICANT CHANGE UP (ref 5–17)
AST SERPL-CCNC: 19 U/L — SIGNIFICANT CHANGE UP (ref 10–40)
BILIRUB SERPL-MCNC: 0.2 MG/DL — SIGNIFICANT CHANGE UP (ref 0.2–1.2)
BUN SERPL-MCNC: 14 MG/DL — SIGNIFICANT CHANGE UP (ref 7–23)
CALCIUM SERPL-MCNC: 8.7 MG/DL — SIGNIFICANT CHANGE UP (ref 8.4–10.5)
CHLORIDE SERPL-SCNC: 104 MMOL/L — SIGNIFICANT CHANGE UP (ref 96–108)
CO2 SERPL-SCNC: 29 MMOL/L — SIGNIFICANT CHANGE UP (ref 22–31)
CREAT SERPL-MCNC: 0.74 MG/DL — SIGNIFICANT CHANGE UP (ref 0.5–1.3)
EGFR: 98 ML/MIN/1.73M2 — SIGNIFICANT CHANGE UP
GLUCOSE SERPL-MCNC: 91 MG/DL — SIGNIFICANT CHANGE UP (ref 70–99)
POTASSIUM SERPL-MCNC: 4.3 MMOL/L — SIGNIFICANT CHANGE UP (ref 3.5–5.3)
POTASSIUM SERPL-SCNC: 4.3 MMOL/L — SIGNIFICANT CHANGE UP (ref 3.5–5.3)
PROT SERPL-MCNC: 6.3 G/DL — SIGNIFICANT CHANGE UP (ref 6–8.3)
SODIUM SERPL-SCNC: 140 MMOL/L — SIGNIFICANT CHANGE UP (ref 135–145)
VALPROATE SERPL-MCNC: 64.9 UG/ML — SIGNIFICANT CHANGE UP (ref 50–100)

## 2022-11-08 PROCEDURE — 99231 SBSQ HOSP IP/OBS SF/LOW 25: CPT

## 2022-11-08 RX ADMIN — RISPERIDONE 2 MILLIGRAM(S): 4 TABLET ORAL at 10:20

## 2022-11-08 RX ADMIN — RISPERIDONE 2 MILLIGRAM(S): 4 TABLET ORAL at 22:30

## 2022-11-08 RX ADMIN — DIVALPROEX SODIUM 500 MILLIGRAM(S): 500 TABLET, DELAYED RELEASE ORAL at 22:29

## 2022-11-08 RX ADMIN — DIVALPROEX SODIUM 500 MILLIGRAM(S): 500 TABLET, DELAYED RELEASE ORAL at 10:21

## 2022-11-08 NOTE — BH INPATIENT PSYCHIATRY PROGRESS NOTE - CURRENT MEDICATION
MEDICATIONS  (STANDING):  diVALproex  milliGRAM(s) Oral two times a day  risperiDONE  Disintegrating Tablet 2 milliGRAM(s) Oral two times a day    MEDICATIONS  (PRN):  diphenhydrAMINE 50 milliGRAM(s) Oral every 6 hours PRN Rash and/or Itching  haloperidol     Tablet 5 milliGRAM(s) Oral every 6 hours PRN agitation  hydrOXYzine hydrochloride 50 milliGRAM(s) Oral every 6 hours PRN Anxiety

## 2022-11-08 NOTE — BH INPATIENT PSYCHIATRY PROGRESS NOTE - NSBHMETABOLIC_PSY_ALL_CORE_FT
BMI: BMI (kg/m2): 23.1 (11-01-22 @ 01:23)  HbA1c:   Glucose:   BP: 106/73 (11-09-22 @ 09:00) (106/73 - 132/87)  Lipid Panel:

## 2022-11-08 NOTE — BH INPATIENT PSYCHIATRY PROGRESS NOTE - PRN MEDS
MEDICATIONS  (PRN):  diphenhydrAMINE 50 milliGRAM(s) Oral every 6 hours PRN Rash and/or Itching  haloperidol     Tablet 5 milliGRAM(s) Oral every 6 hours PRN agitation  hydrOXYzine hydrochloride 50 milliGRAM(s) Oral every 6 hours PRN Anxiety

## 2022-11-08 NOTE — BH INPATIENT PSYCHIATRY PROGRESS NOTE - NSBHMSESPABN_PSY_A_CORE
Loud volume/Pressured rate

## 2022-11-08 NOTE — BH INPATIENT PSYCHIATRY PROGRESS NOTE - NSBHFUPINTERVALHXFT_PSY_A_CORE
Patient is visible on the unit, but uncooperative on approach stating 'I'm not talking to you' MAR review indicates that pt has been compliant with medications. VPA level of 64.9. titration of medication to continue

## 2022-11-08 NOTE — BH INPATIENT PSYCHIATRY PROGRESS NOTE - NSBHCHARTREVIEWVS_PSY_A_CORE FT
Vital Signs Last 24 Hrs  T(C): 36.8 (11-09-22 @ 09:00), Max: 37 (11-08-22 @ 16:46)  T(F): 98.2 (11-09-22 @ 09:00), Max: 98.6 (11-08-22 @ 16:46)  HR: 96 (11-09-22 @ 09:00) (96 - 98)  BP: 106/73 (11-09-22 @ 09:00) (106/73 - 130/86)  BP(mean): --  RR: 18 (11-09-22 @ 09:00) (18 - 18)  SpO2: 98% (11-09-22 @ 09:00) (98% - 99%)

## 2022-11-08 NOTE — BH INPATIENT PSYCHIATRY PROGRESS NOTE - NSBHASSESSSUMMFT_PSY_ALL_CORE
This is a 52 yo  female unemployed, unmarried, reports to be currently domiciled with roommate. Medical hx significant wound on forehead and chronic anemia. Psychiatric hx pertinent for Schizoaffective Disorder, prior hospitalizations (last was 1 month ago per roommate). No known suicide attempts, no legal hx, no drug use. Patient presents to ED via EMS for a wound check and was noted to be making bizarre statements and in poor ability to care for self. Received prns of haldol and ativan while in ED. Patient transferred to [facility] and admitted 2pc.     On evaluation, the patient is irritable, agitated, uncooperative, not verbally redirectable and demonstrating poor behavioral control and disorganized thought processes.  On approach, she is yelling at a staff member calling him a "bum" because she was encouraged to take a shower.  The patient is unable to de-escalate and haldol 5 mg IM, lorazepam 2 mg IM and diphenhydramine 50 mg IM ONCE is ordered for agitation.      Plan:   - Risperdal 1mg qd/2 mg qhs  increased to 2mg bid  - continue Depakote 500 mg PO BID cmp/vpa ordered for 11/8 64.9  - haldol 5 mg PO/IM, lorazepam 2 mg PO/IM, diphenhydramine 50 mg PO/IM q6h PRN for agitation; hold for qtc >500 ms.    11/7 received IM  prns on 11/6 for agitation, risperdal to be increased to 2mg bid, depakote/cmp level pending for tomorrow am, remains uncooperative, irritable  and dismissive

## 2022-11-09 PROCEDURE — 99232 SBSQ HOSP IP/OBS MODERATE 35: CPT

## 2022-11-09 RX ORDER — RISPERIDONE 4 MG/1
3 TABLET ORAL AT BEDTIME
Refills: 0 | Status: DISCONTINUED | OUTPATIENT
Start: 2022-11-09 | End: 2022-11-14

## 2022-11-09 RX ORDER — RISPERIDONE 4 MG/1
2 TABLET ORAL DAILY
Refills: 0 | Status: DISCONTINUED | OUTPATIENT
Start: 2022-11-10 | End: 2022-11-14

## 2022-11-09 RX ADMIN — DIVALPROEX SODIUM 500 MILLIGRAM(S): 500 TABLET, DELAYED RELEASE ORAL at 21:50

## 2022-11-09 RX ADMIN — DIVALPROEX SODIUM 500 MILLIGRAM(S): 500 TABLET, DELAYED RELEASE ORAL at 13:39

## 2022-11-09 RX ADMIN — RISPERIDONE 3 MILLIGRAM(S): 4 TABLET ORAL at 21:50

## 2022-11-09 RX ADMIN — RISPERIDONE 2 MILLIGRAM(S): 4 TABLET ORAL at 13:39

## 2022-11-09 NOTE — BH INPATIENT PSYCHIATRY PROGRESS NOTE - NSBHASSESSSUMMFT_PSY_ALL_CORE
This is a 50 yo  female unemployed, unmarried, reports to be currently domiciled with roommate. Medical hx significant wound on forehead and chronic anemia. Psychiatric hx pertinent for Schizoaffective Disorder, prior hospitalizations (last was 1 month ago per roommate). No known suicide attempts, no legal hx, no drug use. Patient presents to ED via EMS for a wound check and was noted to be making bizarre statements and in poor ability to care for self. Received prns of haldol and ativan while in ED. Patient transferred to [facility] and admitted 2pc.     On evaluation, the patient is irritable, agitated, uncooperative, not verbally redirectable and demonstrating poor behavioral control and disorganized thought processes.  On approach, she is yelling at a staff member calling him a "bum" because she was encouraged to take a shower.  The patient is unable to de-escalate and haldol 5 mg IM, lorazepam 2 mg IM and diphenhydramine 50 mg IM ONCE is ordered for agitation.      Plan:   - Risperdal 2 mg qhs increased to 2mg qd/3mg qhs  -  Depakote 500 mg PO BID cmp/vpa ordered for 11/8 64.9  - haldol 5 mg PO/IM, lorazepam 2 mg PO/IM, diphenhydramine 50 mg PO/IM q6h PRN for agitation; hold for qtc >500 ms.    11/7 received IM  prns on 11/6 for agitation, risperdal to be increased to 2mg bid, depakote/cmp level pending for tomorrow am, remains uncooperative, irritable  and dismissive

## 2022-11-09 NOTE — BH INPATIENT PSYCHIATRY PROGRESS NOTE - NSBHCHARTREVIEWVS_PSY_A_CORE FT
Vital Signs Last 24 Hrs  T(C): 36.9 (11-09-22 @ 16:22), Max: 36.9 (11-09-22 @ 16:22)  T(F): 98.4 (11-09-22 @ 16:22), Max: 98.4 (11-09-22 @ 16:22)  HR: 106 (11-09-22 @ 16:22) (96 - 106)  BP: 112/76 (11-09-22 @ 16:22) (106/73 - 112/76)  BP(mean): --  RR: 18 (11-09-22 @ 16:22) (18 - 18)  SpO2: 100% (11-09-22 @ 16:22) (98% - 100%)

## 2022-11-09 NOTE — BH TREATMENT PLAN - NSTXPSYCHOINTERMD_PSY_ALL_CORE
Psychopharmacology x15 minutes, will titrate meds ( depakote and risperdal) as appropriate Psychopharmacology x15 minutes, will titrate meds ( depakote 500mg and risperdal 2 mg, 3 mg) as appropriate

## 2022-11-09 NOTE — BH INPATIENT PSYCHIATRY PROGRESS NOTE - NSBHFUPINTERVALHXFT_PSY_A_CORE
Patient visible on the unit at intervals, patient went to court today after submitting a 72hr letter requesting to be discharged. Though mood was noted to be less irritable patient remains disorganized. Patient to remain hospitalized for further stabilization prior to discharge. Patient aware of plan to receive JOHNS prior to discharge. Grooming has improved, gowns are clean but pt is malodorous. MAR reviewed, pt remains compliant with medication regimen. No acute medical concerns reported. healing wound on forehead visualized today as pt has her scrub cap pulled up to her hairline with forehead fully visible.

## 2022-11-09 NOTE — BH TREATMENT PLAN - NSTXDCOPLKINTERMD_PSY_ALL_CORE
Psychopharmacology x15 minutes, will titrate meds ( depakote and risperdal) as appropriate Psychopharmacology x15 minutes, will titrate meds ( depakote 500mg and risperdal) as appropriate

## 2022-11-10 PROCEDURE — 99232 SBSQ HOSP IP/OBS MODERATE 35: CPT

## 2022-11-10 RX ORDER — DIVALPROEX SODIUM 500 MG/1
500 TABLET, DELAYED RELEASE ORAL DAILY
Refills: 0 | Status: DISCONTINUED | OUTPATIENT
Start: 2022-11-11 | End: 2022-12-02

## 2022-11-10 RX ORDER — DIVALPROEX SODIUM 500 MG/1
500 TABLET, DELAYED RELEASE ORAL AT BEDTIME
Refills: 0 | Status: DISCONTINUED | OUTPATIENT
Start: 2022-11-10 | End: 2022-11-15

## 2022-11-10 RX ADMIN — DIVALPROEX SODIUM 500 MILLIGRAM(S): 500 TABLET, DELAYED RELEASE ORAL at 21:57

## 2022-11-10 RX ADMIN — DIVALPROEX SODIUM 500 MILLIGRAM(S): 500 TABLET, DELAYED RELEASE ORAL at 10:26

## 2022-11-10 RX ADMIN — RISPERIDONE 3 MILLIGRAM(S): 4 TABLET ORAL at 21:57

## 2022-11-10 RX ADMIN — RISPERIDONE 2 MILLIGRAM(S): 4 TABLET ORAL at 10:26

## 2022-11-10 NOTE — BH INPATIENT PSYCHIATRY PROGRESS NOTE - CURRENT MEDICATION
MEDICATIONS  (STANDING):  divalproex  milliGRAM(s) Oral daily  divalproex  milliGRAM(s) Oral at bedtime  risperiDONE   Tablet 2 milliGRAM(s) Oral daily  risperiDONE   Tablet 3 milliGRAM(s) Oral at bedtime    MEDICATIONS  (PRN):  diphenhydrAMINE 50 milliGRAM(s) Oral every 6 hours PRN Rash and/or Itching  haloperidol     Tablet 5 milliGRAM(s) Oral every 6 hours PRN agitation  hydrOXYzine hydrochloride 50 milliGRAM(s) Oral every 6 hours PRN Anxiety

## 2022-11-10 NOTE — BH INPATIENT PSYCHIATRY PROGRESS NOTE - NSBHMETABOLIC_PSY_ALL_CORE_FT
BMI: BMI (kg/m2): 23.1 (11-01-22 @ 01:23)  HbA1c:   Glucose:   BP: 131/80 (11-10-22 @ 09:47) (106/73 - 132/87)  Lipid Panel:

## 2022-11-10 NOTE — BH INPATIENT PSYCHIATRY PROGRESS NOTE - NSBHFUPINTERVALHXFT_PSY_A_CORE
Patient seen in her room today, noted to be laying on bed flipping through a newspaper. Dismissive, highly irritable and angry on approach, stating to writer 'stay away from me, I don't want to have anything to do with you, you are a f*cking n*gger.' Unable to be further engaged and interaction terminated.   MAR reviewed, pt compliant with regimen, meds to be further titrated.

## 2022-11-10 NOTE — BH INPATIENT PSYCHIATRY PROGRESS NOTE - NSBHCHARTREVIEWVS_PSY_A_CORE FT
Vital Signs Last 24 Hrs  T(C): 37 (11-10-22 @ 09:47), Max: 37 (11-10-22 @ 09:47)  T(F): 98.6 (11-10-22 @ 09:47), Max: 98.6 (11-10-22 @ 09:47)  HR: 99 (11-10-22 @ 09:47) (99 - 106)  BP: 131/80 (11-10-22 @ 09:47) (112/76 - 131/80)  BP(mean): --  RR: 18 (11-10-22 @ 09:47) (18 - 18)  SpO2: 97% (11-10-22 @ 09:47) (97% - 100%)

## 2022-11-10 NOTE — BH INPATIENT PSYCHIATRY PROGRESS NOTE - NSBHASSESSSUMMFT_PSY_ALL_CORE
This is a 52 yo  female unemployed, unmarried, reports to be currently domiciled with roommate. Medical hx significant wound on forehead and chronic anemia. Psychiatric hx pertinent for Schizoaffective Disorder, prior hospitalizations (last was 1 month ago per roommate). No known suicide attempts, no legal hx, no drug use. Patient presents to ED via EMS for a wound check and was noted to be making bizarre statements and in poor ability to care for self. Received prns of haldol and ativan while in ED. Patient transferred to [facility] and admitted 2pc.     On evaluation, the patient is irritable, agitated, uncooperative, not verbally redirectable and demonstrating poor behavioral control and disorganized thought processes.  On approach, she is yelling at a staff member calling him a "bum" because she was encouraged to take a shower.  The patient is unable to de-escalate and haldol 5 mg IM, lorazepam 2 mg IM and diphenhydramine 50 mg IM ONCE is ordered for agitation.      Plan:   - Risperdal 2mg qd/3mg qhs  -  Depakote 500 mg qd, 1000mg qhs (vpa  11/8/22: 64.9)  - haldol 5 mg PO/IM, lorazepam 2 mg PO/IM, diphenhydramine 50 mg PO/IM q6h PRN for agitation; hold for qtc >500 ms.    11/7 received IM  prns on 11/6 for agitation, risperdal to be increased to 2mg bid, depakote/cmp level pending for tomorrow am, remains uncooperative, irritable  and dismissive  11/8 Compliant with med regimen, irritable, dismissive, psychotic  11/9 Retained by court for further treatment, risperdal increased to 2mg qd/3mg qhs for ongoing disorganization and flight of ideas  11/10 Highly irritable, unable to be engaged, taking medications. Depakote increased to 500mg qd/1000mg qhs

## 2022-11-11 PROCEDURE — 99231 SBSQ HOSP IP/OBS SF/LOW 25: CPT

## 2022-11-11 RX ADMIN — DIVALPROEX SODIUM 500 MILLIGRAM(S): 500 TABLET, DELAYED RELEASE ORAL at 21:34

## 2022-11-11 RX ADMIN — RISPERIDONE 3 MILLIGRAM(S): 4 TABLET ORAL at 21:34

## 2022-11-11 RX ADMIN — RISPERIDONE 2 MILLIGRAM(S): 4 TABLET ORAL at 10:15

## 2022-11-11 RX ADMIN — DIVALPROEX SODIUM 500 MILLIGRAM(S): 500 TABLET, DELAYED RELEASE ORAL at 10:14

## 2022-11-11 NOTE — BH INPATIENT PSYCHIATRY PROGRESS NOTE - NSBHMETABOLIC_PSY_ALL_CORE_FT
BMI: BMI (kg/m2): 23.1 (11-01-22 @ 01:23)  HbA1c:   Glucose:   BP: 131/80 (11-10-22 @ 09:47) (106/73 - 131/80)  Lipid Panel:

## 2022-11-11 NOTE — BH INPATIENT PSYCHIATRY PROGRESS NOTE - NSBHASSESSSUMMFT_PSY_ALL_CORE
This is a 50 yo  female unemployed, unmarried, reports to be currently domiciled with roommate. Medical hx significant wound on forehead and chronic anemia. Psychiatric hx pertinent for Schizoaffective Disorder, prior hospitalizations (last was 1 month ago per roommate). No known suicide attempts, no legal hx, no drug use. Patient presents to ED via EMS for a wound check and was noted to be making bizarre statements and in poor ability to care for self. Received prns of haldol and ativan while in ED. Patient transferred to [facility] and admitted 2pc.     On evaluation, the patient is irritable, agitated, uncooperative, not verbally redirectable and demonstrating poor behavioral control and disorganized thought processes.  On approach, she is yelling at a staff member calling him a "bum" because she was encouraged to take a shower.  The patient is unable to de-escalate and haldol 5 mg IM, lorazepam 2 mg IM and diphenhydramine 50 mg IM ONCE is ordered for agitation.      Plan:   - Risperdal 2mg qd/3mg qhs  -  Depakote 500 mg qd, 1000mg qhs (vpa  11/8/22: 64.9)  - haldol 5 mg PO/IM, lorazepam 2 mg PO/IM, diphenhydramine 50 mg PO/IM q6h PRN for agitation; hold for qtc >500 ms.    11/7 received IM  prns on 11/6 for agitation, risperdal to be increased to 2mg bid, depakote/cmp level pending for tomorrow am, remains uncooperative, irritable  and dismissive  11/8 Compliant with med regimen, irritable, dismissive, psychotic  11/9 Retained by court for further treatment, risperdal increased to 2mg qd/3mg qhs for ongoing disorganization and flight of ideas  11/10 Highly irritable, unable to be engaged, taking medications. Depakote increased to 500mg qd/1000mg qhs  11/11 Remains irritable, but is taking medications

## 2022-11-11 NOTE — BH INPATIENT PSYCHIATRY PROGRESS NOTE - NSBHFUPINTERVALHXFT_PSY_A_CORE
Patient seen in her room today, on approach states 'what is it now?' irritably and walks away from writer. MAR review indicates that pt has been compliant with medications

## 2022-11-12 RX ADMIN — RISPERIDONE 2 MILLIGRAM(S): 4 TABLET ORAL at 10:06

## 2022-11-12 RX ADMIN — DIVALPROEX SODIUM 500 MILLIGRAM(S): 500 TABLET, DELAYED RELEASE ORAL at 10:14

## 2022-11-12 RX ADMIN — DIVALPROEX SODIUM 500 MILLIGRAM(S): 500 TABLET, DELAYED RELEASE ORAL at 21:40

## 2022-11-12 RX ADMIN — RISPERIDONE 3 MILLIGRAM(S): 4 TABLET ORAL at 21:40

## 2022-11-13 RX ADMIN — RISPERIDONE 2 MILLIGRAM(S): 4 TABLET ORAL at 10:55

## 2022-11-13 RX ADMIN — DIVALPROEX SODIUM 500 MILLIGRAM(S): 500 TABLET, DELAYED RELEASE ORAL at 10:55

## 2022-11-13 RX ADMIN — DIVALPROEX SODIUM 500 MILLIGRAM(S): 500 TABLET, DELAYED RELEASE ORAL at 21:21

## 2022-11-13 RX ADMIN — RISPERIDONE 3 MILLIGRAM(S): 4 TABLET ORAL at 21:21

## 2022-11-14 PROCEDURE — 99233 SBSQ HOSP IP/OBS HIGH 50: CPT

## 2022-11-14 RX ORDER — RISPERIDONE 4 MG/1
3 TABLET ORAL
Refills: 0 | Status: DISCONTINUED | OUTPATIENT
Start: 2022-11-14 | End: 2022-12-02

## 2022-11-14 RX ADMIN — RISPERIDONE 3 MILLIGRAM(S): 4 TABLET ORAL at 21:54

## 2022-11-14 RX ADMIN — DIVALPROEX SODIUM 500 MILLIGRAM(S): 500 TABLET, DELAYED RELEASE ORAL at 21:53

## 2022-11-14 RX ADMIN — DIVALPROEX SODIUM 500 MILLIGRAM(S): 500 TABLET, DELAYED RELEASE ORAL at 10:34

## 2022-11-14 RX ADMIN — RISPERIDONE 2 MILLIGRAM(S): 4 TABLET ORAL at 10:35

## 2022-11-14 NOTE — BH INPATIENT PSYCHIATRY PROGRESS NOTE - NSBHCHARTREVIEWVS_PSY_A_CORE FT
Vital Signs Last 24 Hrs  T(C): 36.7 (11-14-22 @ 08:45), Max: 36.7 (11-14-22 @ 08:45)  T(F): 98.1 (11-14-22 @ 08:45), Max: 98.1 (11-14-22 @ 08:45)  HR: 96 (11-14-22 @ 08:45) (96 - 96)  BP: 149/83 (11-14-22 @ 08:45) (149/83 - 149/83)  BP(mean): --  RR: 18 (11-14-22 @ 08:45) (18 - 18)  SpO2: 98% (11-14-22 @ 08:45) (98% - 98%)

## 2022-11-14 NOTE — BH INPATIENT PSYCHIATRY PROGRESS NOTE - NSBHFUPINTERVALHXFT_PSY_A_CORE
Patient visible on the unit, remains in gowns and scrub cap, cooperative on approach, she states that she is 'ok.' Noted to be more engaging with writer today than on previous occasions, she states that her roommate Bart Cardozo [phone] came to visit her over the weekend and was supposed to visit her today but couldn't make it. She gives the team permission to speak with him. She is discharge focused and wants to talk about a discharge date. Medications were discussed and pt appears open to idea of [facility] but states that in the past it has made her teeth hurt and affects her menstrual cycle. Pt hints that she may be experiencing early menopause with erratic menstrual periods. She is grossly organized, but at times needs redirection. She goes on to state that [facility] [address] and is restricted to a [facility] pharmacy. No si/hi/avh or PI endorsed. Malodorous but states that she takes daily showers.   Spontaneously apologizes to writer for speech/behavior last week.  Remains compliant with medication regimen.

## 2022-11-14 NOTE — BH INPATIENT PSYCHIATRY PROGRESS NOTE - NSBHASSESSSUMMFT_PSY_ALL_CORE
This is a 50 yo  female unemployed, unmarried, reports to be currently domiciled with roommate. Medical hx significant wound on forehead and chronic anemia. Psychiatric hx pertinent for Schizoaffective Disorder, prior hospitalizations (last was 1 month ago per roommate). No known suicide attempts, no legal hx, no drug use. Patient presents to ED via EMS for a wound check and was noted to be making bizarre statements and in poor ability to care for self. Received prns of haldol and ativan while in ED. Patient transferred to [facility] and admitted 2pc.     On evaluation, the patient is irritable, agitated, uncooperative, not verbally redirectable and demonstrating poor behavioral control and disorganized thought processes.  On approach, she is yelling at a staff member calling him a "bum" because she was encouraged to take a shower.  The patient is unable to de-escalate and haldol 5 mg IM, lorazepam 2 mg IM and diphenhydramine 50 mg IM ONCE is ordered for agitation.      Plan:   - Risperdal increased to 3mg bid  -  Depakote 500 mg qd, 500mg qhs (vpa  11/8/22: 64.9)  - haldol 5 mg PO/IM, lorazepam 2 mg PO/IM, diphenhydramine 50 mg PO/IM q6h PRN for agitation; hold for qtc >500 ms.    11/7 received IM  prns on 11/6 for agitation, risperdal to be increased to 2mg bid, depakote/cmp level pending for tomorrow am, remains uncooperative, irritable  and dismissive  11/8 Compliant with med regimen, irritable, dismissive, psychotic  11/9 Retained by court for further treatment, risperdal increased to 2mg qd/3mg qhs for ongoing disorganization and flight of ideas  11/10 Highly irritable, unable to be engaged, taking medications.   11/11 Remains irritable, but is taking medications  11/14 Better related, improved mood, grossly organized, risperdal increased to 3mg bid

## 2022-11-14 NOTE — BH INPATIENT PSYCHIATRY PROGRESS NOTE - NSBHMETABOLIC_PSY_ALL_CORE_FT
BMI: BMI (kg/m2): 23.1 (11-01-22 @ 01:23)  HbA1c:   Glucose:   BP: 149/83 (11-14-22 @ 08:45) (111/76 - 149/83)  Lipid Panel:

## 2022-11-15 LAB
APPEARANCE UR: CLEAR — SIGNIFICANT CHANGE UP
BACTERIA # UR AUTO: PRESENT /HPF
BILIRUB UR-MCNC: NEGATIVE — SIGNIFICANT CHANGE UP
COLOR SPEC: YELLOW — SIGNIFICANT CHANGE UP
COMMENT - URINE: SIGNIFICANT CHANGE UP
DIFF PNL FLD: ABNORMAL
EPI CELLS # UR: SIGNIFICANT CHANGE UP /HPF (ref 0–5)
GLUCOSE UR QL: NEGATIVE — SIGNIFICANT CHANGE UP
KETONES UR-MCNC: NEGATIVE — SIGNIFICANT CHANGE UP
LEUKOCYTE ESTERASE UR-ACNC: NEGATIVE — SIGNIFICANT CHANGE UP
NITRITE UR-MCNC: NEGATIVE — SIGNIFICANT CHANGE UP
PH UR: 6.5 — SIGNIFICANT CHANGE UP (ref 5–8)
PROT UR-MCNC: NEGATIVE MG/DL — SIGNIFICANT CHANGE UP
RBC CASTS # UR COMP ASSIST: ABNORMAL /HPF
SP GR SPEC: 1.01 — SIGNIFICANT CHANGE UP (ref 1–1.03)
UROBILINOGEN FLD QL: 0.2 E.U./DL — SIGNIFICANT CHANGE UP
WBC UR QL: < 5 /HPF — SIGNIFICANT CHANGE UP

## 2022-11-15 PROCEDURE — 99233 SBSQ HOSP IP/OBS HIGH 50: CPT

## 2022-11-15 RX ORDER — DIVALPROEX SODIUM 500 MG/1
1000 TABLET, DELAYED RELEASE ORAL AT BEDTIME
Refills: 0 | Status: DISCONTINUED | OUTPATIENT
Start: 2022-11-15 | End: 2022-12-02

## 2022-11-15 RX ADMIN — RISPERIDONE 3 MILLIGRAM(S): 4 TABLET ORAL at 10:39

## 2022-11-15 RX ADMIN — RISPERIDONE 3 MILLIGRAM(S): 4 TABLET ORAL at 21:15

## 2022-11-15 RX ADMIN — DIVALPROEX SODIUM 1000 MILLIGRAM(S): 500 TABLET, DELAYED RELEASE ORAL at 21:15

## 2022-11-15 RX ADMIN — DIVALPROEX SODIUM 500 MILLIGRAM(S): 500 TABLET, DELAYED RELEASE ORAL at 10:38

## 2022-11-15 NOTE — BH INPATIENT PSYCHIATRY PROGRESS NOTE - CURRENT MEDICATION
MEDICATIONS  (STANDING):  divalproex  milliGRAM(s) Oral daily  divalproex DR 1000 milliGRAM(s) Oral at bedtime  risperiDONE   Tablet 3 milliGRAM(s) Oral two times a day    MEDICATIONS  (PRN):  diphenhydrAMINE 50 milliGRAM(s) Oral every 6 hours PRN Rash and/or Itching  haloperidol     Tablet 5 milliGRAM(s) Oral every 6 hours PRN agitation  hydrOXYzine hydrochloride 50 milliGRAM(s) Oral every 6 hours PRN Anxiety

## 2022-11-15 NOTE — BH INPATIENT PSYCHIATRY PROGRESS NOTE - NSBHASSESSSUMMFT_PSY_ALL_CORE
This is a 52 yo  female unemployed, unmarried, reports to be currently domiciled with roommate. Medical hx significant wound on forehead and chronic anemia. Psychiatric hx pertinent for Schizoaffective Disorder, prior hospitalizations (last was 1 month ago per roommate). No known suicide attempts, no legal hx, no drug use. Patient presents to ED via EMS for a wound check and was noted to be making bizarre statements and in poor ability to care for self. Received prns of haldol and ativan while in ED. Patient transferred to [facility] and admitted 2pc.     On evaluation, the patient is irritable, agitated, uncooperative, not verbally redirectable and demonstrating poor behavioral control and disorganized thought processes.  On approach, she is yelling at a staff member calling him a "bum" because she was encouraged to take a shower.  The patient is unable to de-escalate and haldol 5 mg IM, lorazepam 2 mg IM and diphenhydramine 50 mg IM ONCE is ordered for agitation.      Plan:   - Risperdal 3mg bid  -  Depakote 500 mg qd, 1000mg qhs (vpa  11/8/22: 64.9)  - haldol 5 mg PO/IM, lorazepam 2 mg PO/IM, diphenhydramine 50 mg PO/IM q6h PRN for agitation; hold for qtc >500 ms.    11/7 received IM  prns on 11/6 for agitation, risperdal to be increased to 2mg bid, depakote/cmp level pending for tomorrow am, remains uncooperative, irritable  and dismissive  11/8 Compliant with med regimen, irritable, dismissive, psychotic  11/9 Retained by court for further treatment, risperdal increased to 2mg qd/3mg qhs for ongoing disorganization and flight of ideas  11/10 Highly irritable, unable to be engaged, taking medications.   11/11 Remains irritable, but is taking medications  11/14 Better related, improved mood, grossly organized, risperdal increased to 3mg bid  11/15 VPA increased to total of 1500mg qd due to labile mood

## 2022-11-15 NOTE — BH INPATIENT PSYCHIATRY PROGRESS NOTE - NSBHCHARTREVIEWVS_PSY_A_CORE FT
Vital Signs Last 24 Hrs  T(C): 36.8 (11-15-22 @ 08:55), Max: 36.8 (11-15-22 @ 08:55)  T(F): 98.2 (11-15-22 @ 08:55), Max: 98.2 (11-15-22 @ 08:55)  HR: 94 (11-15-22 @ 08:55) (94 - 94)  BP: 124/81 (11-15-22 @ 08:55) (124/81 - 124/81)  BP(mean): --  RR: 18 (11-15-22 @ 08:55) (18 - 18)  SpO2: 100% (11-15-22 @ 08:55) (100% - 100%)

## 2022-11-15 NOTE — BH INPATIENT PSYCHIATRY PROGRESS NOTE - NSBHMETABOLIC_PSY_ALL_CORE_FT
BMI: BMI (kg/m2): 23.1 (11-01-22 @ 01:23)  HbA1c:   Glucose:   BP: 124/81 (11-15-22 @ 08:55) (115/73 - 149/83)  Lipid Panel:

## 2022-11-15 NOTE — BH INPATIENT PSYCHIATRY PROGRESS NOTE - NSBHFUPINTERVALHXFT_PSY_A_CORE
Patient visible on the unit, remains in gowns and scrub cap, malodorous smelling of urine on approach. Cooperative and interactive, waving at writer. Initially is pleasant, with no complaints but as conversation progresses pt becomes disorganized and irritable. Poor physical boundaries noted as pt gets extremely close to writer and requires much verbal redirection to provide space for continued interaction. States that she takes showers daily at 2am, this is not verified. Room noted to be in disarray and filthy.   No reported si/hi/avh

## 2022-11-16 PROCEDURE — 99232 SBSQ HOSP IP/OBS MODERATE 35: CPT

## 2022-11-16 RX ADMIN — DIVALPROEX SODIUM 500 MILLIGRAM(S): 500 TABLET, DELAYED RELEASE ORAL at 09:50

## 2022-11-16 RX ADMIN — DIVALPROEX SODIUM 1000 MILLIGRAM(S): 500 TABLET, DELAYED RELEASE ORAL at 21:06

## 2022-11-16 RX ADMIN — RISPERIDONE 3 MILLIGRAM(S): 4 TABLET ORAL at 21:07

## 2022-11-16 RX ADMIN — RISPERIDONE 3 MILLIGRAM(S): 4 TABLET ORAL at 09:50

## 2022-11-16 NOTE — BH TREATMENT PLAN - NSTXPSYCHOINTERMD_PSY_ALL_CORE
Psychopharmacology x15 minutes, will titrate meds ( depakote 500mg and risperdal 2 mg, 3 mg) as appropriate Psychopharmacology x15 minutes, will titrate meds ( depakote 500mg and risperdal 2 mg, 3 mg) as appropriate, Invega Roberto, discussing ways to improve her hygiene.

## 2022-11-16 NOTE — BH INPATIENT PSYCHIATRY PROGRESS NOTE - NSBHFUPINTERVALHXFT_PSY_A_CORE
Patient visible on the unit, remains in gowns and scrub cap, malodorous smelling of urine on approach. Has no physical or psychiatric complaints. She states that she has been taking her depakote and risperdal and denies having any side effects or adverse reactions 'it calms me down.' No si/hi/avh or PI endorsed, at times noted to have an irritable edge, does have more moments of organized thoughts, but with prolonged conversation quickly becomes derailed and with flight of ideas.

## 2022-11-16 NOTE — BH INPATIENT PSYCHIATRY PROGRESS NOTE - NSBHASSESSSUMMFT_PSY_ALL_CORE
This is a 52 yo  female unemployed, unmarried, reports to be currently domiciled with roommate. Medical hx significant wound on forehead and chronic anemia. Psychiatric hx pertinent for Schizoaffective Disorder, prior hospitalizations (last was 1 month ago per roommate). No known suicide attempts, no legal hx, no drug use. Patient presents to ED via EMS for a wound check and was noted to be making bizarre statements and in poor ability to care for self. Received prns of haldol and ativan while in ED. Patient transferred to [facility] and admitted 2pc.     On evaluation, the patient is irritable, agitated, uncooperative, not verbally redirectable and demonstrating poor behavioral control and disorganized thought processes.  On approach, she is yelling at a staff member calling him a "bum" because she was encouraged to take a shower.  The patient is unable to de-escalate and haldol 5 mg IM, lorazepam 2 mg IM and diphenhydramine 50 mg IM ONCE is ordered for agitation.      Plan:   - Risperdal 3mg bid  -  Depakote 500 mg qd, 1000mg qhs (vpa  11/8/22: 64.9)  - haldol 5 mg PO/IM, lorazepam 2 mg PO/IM, diphenhydramine 50 mg PO/IM q6h PRN for agitation; hold for qtc >500 ms.    11/7 received IM  prns on 11/6 for agitation, risperdal to be increased to 2mg bid, depakote/cmp level pending for tomorrow am, remains uncooperative, irritable  and dismissive  11/8 Compliant with med regimen, irritable, dismissive, psychotic  11/9 Retained by court for further treatment, risperdal increased to 2mg qd/3mg qhs for ongoing disorganization and flight of ideas  11/10 Highly irritable, unable to be engaged, taking medications.   11/11 Remains irritable, but is taking medications  11/14 Better related, improved mood, grossly organized, risperdal increased to 3mg bid  11/15 VPA increased to total of 1500mg qd due to labile mood  11/16 labile mood, has more moments of organized thoughts

## 2022-11-16 NOTE — BH INPATIENT PSYCHIATRY PROGRESS NOTE - NSBHCHARTREVIEWVS_PSY_A_CORE FT
Vital Signs Last 24 Hrs  T(C): 37 (11-16-22 @ 16:48), Max: 37 (11-16-22 @ 16:48)  T(F): 98.6 (11-16-22 @ 16:48), Max: 98.6 (11-16-22 @ 16:48)  HR: 100 (11-16-22 @ 16:48) (88 - 106)  BP: 111/77 (11-16-22 @ 16:48) (111/77 - 134/83)  BP(mean): --  RR: 18 (11-16-22 @ 16:48) (18 - 18)  SpO2: 98% (11-16-22 @ 16:48) (98% - 100%)

## 2022-11-16 NOTE — BH TREATMENT PLAN - NSTXDCOPLKINTERMD_PSY_ALL_CORE
Psychopharmacology x15 minutes, will titrate meds ( depakote 500mg and risperdal) as appropriate Psychopharmacology x15 minutes, will titrate meds ( depakote 500mg and risperdal) as appropriate, Invega Sustenna to be administered

## 2022-11-16 NOTE — BH INPATIENT PSYCHIATRY PROGRESS NOTE - NSBHMETABOLIC_PSY_ALL_CORE_FT
BMI: BMI (kg/m2): 23.1 (11-01-22 @ 01:23)  HbA1c:   Glucose:   BP: 111/77 (11-16-22 @ 16:48) (111/77 - 149/83)  Lipid Panel:

## 2022-11-17 PROCEDURE — 99232 SBSQ HOSP IP/OBS MODERATE 35: CPT

## 2022-11-17 RX ADMIN — RISPERIDONE 3 MILLIGRAM(S): 4 TABLET ORAL at 09:56

## 2022-11-17 RX ADMIN — DIVALPROEX SODIUM 500 MILLIGRAM(S): 500 TABLET, DELAYED RELEASE ORAL at 09:59

## 2022-11-17 RX ADMIN — DIVALPROEX SODIUM 1000 MILLIGRAM(S): 500 TABLET, DELAYED RELEASE ORAL at 21:31

## 2022-11-17 RX ADMIN — RISPERIDONE 3 MILLIGRAM(S): 4 TABLET ORAL at 21:31

## 2022-11-17 NOTE — BH INPATIENT PSYCHIATRY PROGRESS NOTE - NSBHCHARTREVIEWVS_PSY_A_CORE FT
Vital Signs Last 24 Hrs  T(C): 36.7 (11-17-22 @ 08:45), Max: 37 (11-16-22 @ 16:48)  T(F): 98 (11-17-22 @ 08:45), Max: 98.6 (11-16-22 @ 16:48)  HR: 98 (11-17-22 @ 08:45) (98 - 100)  BP: 128/81 (11-17-22 @ 08:45) (111/77 - 128/81)  BP(mean): --  RR: 18 (11-17-22 @ 08:45) (18 - 18)  SpO2: 98% (11-17-22 @ 08:45) (98% - 98%)

## 2022-11-17 NOTE — BH INPATIENT PSYCHIATRY PROGRESS NOTE - NSBHMETABOLIC_PSY_ALL_CORE_FT
BMI: BMI (kg/m2): 23.1 (11-01-22 @ 01:23)  HbA1c:   Glucose:   BP: 128/81 (11-17-22 @ 08:45) (111/77 - 134/83)  Lipid Panel:

## 2022-11-17 NOTE — BH INPATIENT PSYCHIATRY PROGRESS NOTE - NSBHFUPINTERVALHXFT_PSY_A_CORE
Patient visible on the unit, remains in gowns and scrub cap, malodorous smelling of urine on approach. Has no physical or psychiatric complaints. She states that she has been compliant with medication regimen. States that she has been in contact with her roommate who is going to go away to rehab for 3 years. She is upset that she will not be able to see him. No si/hi/avh or PI    Bart Cardozo [phone] contacted by writer he states that he is her rooommate and he has visited her since her admission. He feels that she is not at her baseline. When she is doing well she is more interested in her appearance and mood is stable. He states that she does have a mental illness and he has to encourage her to take her medications, but sometimes she refuses. He states that she does not have a hx of aggression, he noticed a change in  her behavior 3 weeks prior to admission. She was missing for a couple weeks then turned up with a wound on her forehead that she refuses to discuss. He intends to come to visit her on Saturday and bring clothing. He requests that he be contacted prior to her discharge.

## 2022-11-17 NOTE — BH INPATIENT PSYCHIATRY PROGRESS NOTE - NSBHASSESSSUMMFT_PSY_ALL_CORE
This is a 52 yo  female unemployed, unmarried, reports to be currently domiciled with roommate. Medical hx significant wound on forehead and chronic anemia. Psychiatric hx pertinent for Schizoaffective Disorder, prior hospitalizations (last was 1 month ago per roommate). No known suicide attempts, no legal hx, no drug use. Patient presents to ED via EMS for a wound check and was noted to be making bizarre statements and in poor ability to care for self. Received prns of haldol and ativan while in ED. Patient transferred to [facility] and admitted 2pc.     On evaluation, the patient is irritable, agitated, uncooperative, not verbally redirectable and demonstrating poor behavioral control and disorganized thought processes.  On approach, she is yelling at a staff member calling him a "bum" because she was encouraged to take a shower.  The patient is unable to de-escalate and haldol 5 mg IM, lorazepam 2 mg IM and diphenhydramine 50 mg IM ONCE is ordered for agitation.      Plan:   - Risperdal 3mg bid  -  Depakote 500 mg qd, 1000mg qhs (vpa  11/8/22: 64.9)  - haldol 5 mg PO/IM, lorazepam 2 mg PO/IM, diphenhydramine 50 mg PO/IM q6h PRN for agitation; hold for qtc >500 ms.    11/7 received IM  prns on 11/6 for agitation, risperdal to be increased to 2mg bid, depakote/cmp level pending for tomorrow am, remains uncooperative, irritable  and dismissive  11/8 Compliant with med regimen, irritable, dismissive, psychotic  11/9 Retained by court for further treatment, risperdal increased to 2mg qd/3mg qhs for ongoing disorganization and flight of ideas  11/10 Highly irritable, unable to be engaged, taking medications.   11/11 Remains irritable, but is taking medications  11/14 Better related, improved mood, grossly organized, risperdal increased to 3mg bid  11/15 VPA increased to total of 1500mg qd due to labile mood  11/16 labile mood, has more moments of organized thoughts  11/17 remains unchanged

## 2022-11-18 PROCEDURE — 99232 SBSQ HOSP IP/OBS MODERATE 35: CPT

## 2022-11-18 RX ADMIN — RISPERIDONE 3 MILLIGRAM(S): 4 TABLET ORAL at 21:21

## 2022-11-18 RX ADMIN — DIVALPROEX SODIUM 1000 MILLIGRAM(S): 500 TABLET, DELAYED RELEASE ORAL at 21:20

## 2022-11-18 RX ADMIN — RISPERIDONE 3 MILLIGRAM(S): 4 TABLET ORAL at 09:39

## 2022-11-18 RX ADMIN — DIVALPROEX SODIUM 500 MILLIGRAM(S): 500 TABLET, DELAYED RELEASE ORAL at 09:39

## 2022-11-18 NOTE — BH INPATIENT PSYCHIATRY PROGRESS NOTE - NSBHCHARTREVIEWVS_PSY_A_CORE FT
Vital Signs Last 24 Hrs  T(C): 37.1 (11-18-22 @ 17:08), Max: 37.1 (11-18-22 @ 17:08)  T(F): 98.7 (11-18-22 @ 17:08), Max: 98.7 (11-18-22 @ 17:08)  HR: 90 (11-18-22 @ 17:08) (90 - 99)  BP: 109/72 (11-18-22 @ 17:08) (109/72 - 126/82)  BP(mean): --  RR: 18 (11-18-22 @ 17:08) (18 - 18)  SpO2: 99% (11-18-22 @ 17:08) (99% - 99%)

## 2022-11-18 NOTE — BH INPATIENT PSYCHIATRY PROGRESS NOTE - NSBHMETABOLIC_PSY_ALL_CORE_FT
BMI: BMI (kg/m2): 23.1 (11-01-22 @ 01:23)  HbA1c:   Glucose:   BP: 109/72 (11-18-22 @ 17:08) (109/72 - 134/83)  Lipid Panel:

## 2022-11-18 NOTE — BH INPATIENT PSYCHIATRY PROGRESS NOTE - NSBHASSESSSUMMFT_PSY_ALL_CORE
This is a 50 yo  female unemployed, unmarried, reports to be currently domiciled with roommate. Medical hx significant wound on forehead and chronic anemia. Psychiatric hx pertinent for Schizoaffective Disorder, prior hospitalizations (last was 1 month ago per roommate). No known suicide attempts, no legal hx, no drug use. Patient presents to ED via EMS for a wound check and was noted to be making bizarre statements and in poor ability to care for self. Received prns of haldol and ativan while in ED. Patient transferred to [facility] and admitted 2pc.     On evaluation, the patient is irritable, agitated, uncooperative, not verbally redirectable and demonstrating poor behavioral control and disorganized thought processes.  On approach, she is yelling at a staff member calling him a "bum" because she was encouraged to take a shower.  The patient is unable to de-escalate and haldol 5 mg IM, lorazepam 2 mg IM and diphenhydramine 50 mg IM ONCE is ordered for agitation.      Plan:   - Risperdal 3mg bid  -  Depakote 500 mg qd, 1000mg qhs (vpa  11/8/22: 64.9)  - haldol 5 mg PO/IM, lorazepam 2 mg PO/IM, diphenhydramine 50 mg PO/IM q6h PRN for agitation; hold for qtc >500 ms.    11/7 received IM  prns on 11/6 for agitation, risperdal to be increased to 2mg bid, depakote/cmp level pending for tomorrow am, remains uncooperative, irritable  and dismissive  11/8 Compliant with med regimen, irritable, dismissive, psychotic  11/9 Retained by court for further treatment, risperdal increased to 2mg qd/3mg qhs for ongoing disorganization and flight of ideas  11/10 Highly irritable, unable to be engaged, taking medications.   11/11 Remains irritable, but is taking medications  11/14 Better related, improved mood, grossly organized, risperdal increased to 3mg bid  11/15 VPA increased to total of 1500mg qd due to labile mood  11/16 labile mood, has more moments of organized thoughts  11/17 remains unchanged

## 2022-11-18 NOTE — BH INPATIENT PSYCHIATRY PROGRESS NOTE - NSBHFUPINTERVALHXFT_PSY_A_CORE
Patient visible on the unit, remains in gowns and scrub cap, malodorous smelling of urine on approach. Has no physical or psychiatric complaints. Is seen interacting with peers on the unit, noted to be intrusive, asking prying questions. She is cooperative on approach, stating that she is ready for discharge. Better organized, but quickly becomes derailed.

## 2022-11-19 LAB
ALBUMIN SERPL ELPH-MCNC: 4 G/DL — SIGNIFICANT CHANGE UP (ref 3.3–5)
ALP SERPL-CCNC: 42 U/L — SIGNIFICANT CHANGE UP (ref 40–120)
ALT FLD-CCNC: 7 U/L — LOW (ref 10–45)
ANION GAP SERPL CALC-SCNC: 7 MMOL/L — SIGNIFICANT CHANGE UP (ref 5–17)
AST SERPL-CCNC: 16 U/L — SIGNIFICANT CHANGE UP (ref 10–40)
BILIRUB SERPL-MCNC: <0.2 MG/DL — SIGNIFICANT CHANGE UP (ref 0.2–1.2)
BUN SERPL-MCNC: 23 MG/DL — SIGNIFICANT CHANGE UP (ref 7–23)
CALCIUM SERPL-MCNC: 8.6 MG/DL — SIGNIFICANT CHANGE UP (ref 8.4–10.5)
CHLORIDE SERPL-SCNC: 101 MMOL/L — SIGNIFICANT CHANGE UP (ref 96–108)
CO2 SERPL-SCNC: 29 MMOL/L — SIGNIFICANT CHANGE UP (ref 22–31)
CREAT SERPL-MCNC: 0.9 MG/DL — SIGNIFICANT CHANGE UP (ref 0.5–1.3)
EGFR: 77 ML/MIN/1.73M2 — SIGNIFICANT CHANGE UP
GLUCOSE SERPL-MCNC: 107 MG/DL — HIGH (ref 70–99)
POTASSIUM SERPL-MCNC: 4.6 MMOL/L — SIGNIFICANT CHANGE UP (ref 3.5–5.3)
POTASSIUM SERPL-SCNC: 4.6 MMOL/L — SIGNIFICANT CHANGE UP (ref 3.5–5.3)
PROT SERPL-MCNC: 7.8 G/DL — SIGNIFICANT CHANGE UP (ref 6–8.3)
SODIUM SERPL-SCNC: 137 MMOL/L — SIGNIFICANT CHANGE UP (ref 135–145)
VALPROATE SERPL-MCNC: 97.1 UG/ML — SIGNIFICANT CHANGE UP (ref 50–100)

## 2022-11-19 RX ADMIN — Medication 50 MILLIGRAM(S): at 13:14

## 2022-11-19 RX ADMIN — RISPERIDONE 3 MILLIGRAM(S): 4 TABLET ORAL at 22:30

## 2022-11-19 RX ADMIN — DIVALPROEX SODIUM 1000 MILLIGRAM(S): 500 TABLET, DELAYED RELEASE ORAL at 22:30

## 2022-11-19 RX ADMIN — DIVALPROEX SODIUM 500 MILLIGRAM(S): 500 TABLET, DELAYED RELEASE ORAL at 09:46

## 2022-11-19 RX ADMIN — HALOPERIDOL DECANOATE 5 MILLIGRAM(S): 100 INJECTION INTRAMUSCULAR at 13:14

## 2022-11-19 RX ADMIN — RISPERIDONE 3 MILLIGRAM(S): 4 TABLET ORAL at 09:47

## 2022-11-20 RX ADMIN — DIVALPROEX SODIUM 1000 MILLIGRAM(S): 500 TABLET, DELAYED RELEASE ORAL at 21:53

## 2022-11-20 RX ADMIN — RISPERIDONE 3 MILLIGRAM(S): 4 TABLET ORAL at 10:49

## 2022-11-20 RX ADMIN — RISPERIDONE 3 MILLIGRAM(S): 4 TABLET ORAL at 21:53

## 2022-11-20 RX ADMIN — DIVALPROEX SODIUM 500 MILLIGRAM(S): 500 TABLET, DELAYED RELEASE ORAL at 10:49

## 2022-11-21 PROCEDURE — 99231 SBSQ HOSP IP/OBS SF/LOW 25: CPT

## 2022-11-21 RX ADMIN — RISPERIDONE 3 MILLIGRAM(S): 4 TABLET ORAL at 11:03

## 2022-11-21 RX ADMIN — RISPERIDONE 3 MILLIGRAM(S): 4 TABLET ORAL at 21:32

## 2022-11-21 RX ADMIN — DIVALPROEX SODIUM 500 MILLIGRAM(S): 500 TABLET, DELAYED RELEASE ORAL at 11:03

## 2022-11-21 RX ADMIN — DIVALPROEX SODIUM 1000 MILLIGRAM(S): 500 TABLET, DELAYED RELEASE ORAL at 21:32

## 2022-11-21 NOTE — BH INPATIENT PSYCHIATRY PROGRESS NOTE - NSBHCHARTREVIEWVS_PSY_A_CORE FT
Vital Signs Last 24 Hrs  T(C): 36.6 (11-21-22 @ 17:00), Max: 36.6 (11-21-22 @ 17:00)  T(F): 97.9 (11-21-22 @ 17:00), Max: 97.9 (11-21-22 @ 17:00)  HR: 86 (11-21-22 @ 17:00) (86 - 86)  BP: 123/86 (11-21-22 @ 17:00) (123/86 - 123/86)  BP(mean): --  RR: 17 (11-21-22 @ 17:00) (17 - 17)  SpO2: 98% (11-21-22 @ 17:00) (98% - 98%)

## 2022-11-21 NOTE — BH INPATIENT PSYCHIATRY PROGRESS NOTE - NSBHASSESSSUMMFT_PSY_ALL_CORE
This is a 50 yo  female unemployed, unmarried, reports to be currently domiciled with roommate. Medical hx significant wound on forehead and chronic anemia. Psychiatric hx pertinent for Schizoaffective Disorder, prior hospitalizations (last was 1 month ago per roommate). No known suicide attempts, no legal hx, no drug use. Patient presents to ED via EMS for a wound check and was noted to be making bizarre statements and in poor ability to care for self. Received prns of haldol and ativan while in ED. Patient transferred to [facility] and admitted 2pc.     On evaluation, the patient is irritable, agitated, uncooperative, not verbally redirectable and demonstrating poor behavioral control and disorganized thought processes.  On approach, she is yelling at a staff member calling him a "bum" because she was encouraged to take a shower.  The patient is unable to de-escalate and haldol 5 mg IM, lorazepam 2 mg IM and diphenhydramine 50 mg IM ONCE is ordered for agitation.      Plan:   - Risperdal 3mg bid  -  Depakote 500 mg qd, 1000mg qhs (vpa  11/8/22: 64.9)  - haldol 5 mg PO/IM, lorazepam 2 mg PO/IM, diphenhydramine 50 mg PO/IM q6h PRN for agitation; hold for qtc >500 ms.    11/7 received IM  prns on 11/6 for agitation, risperdal to be increased to 2mg bid, depakote/cmp level pending for tomorrow am, remains uncooperative, irritable  and dismissive  11/8 Compliant with med regimen, irritable, dismissive, psychotic  11/9 Retained by court for further treatment, risperdal increased to 2mg qd/3mg qhs for ongoing disorganization and flight of ideas  11/10 Highly irritable, unable to be engaged, taking medications.   11/11 Remains irritable, but is taking medications  11/14 Better related, improved mood, grossly organized, risperdal increased to 3mg bid  11/15 VPA increased to total of 1500mg qd due to labile mood  11/16 labile mood, has more moments of organized thoughts  11/17 remains unchanged

## 2022-11-21 NOTE — BH INPATIENT PSYCHIATRY PROGRESS NOTE - NSBHMETABOLIC_PSY_ALL_CORE_FT
BMI: BMI (kg/m2): 23.1 (11-01-22 @ 01:23)  HbA1c:   Glucose:   BP: 123/86 (11-21-22 @ 17:00) (109/72 - 124/76)  Lipid Panel:

## 2022-11-21 NOTE — BH INPATIENT PSYCHIATRY PROGRESS NOTE - NSBHFUPINTERVALHXFT_PSY_A_CORE
Patient visible on the unit, remains in gowns and scrub cap, hygiene and grooming appears to have improved. Per staff report, with much encouragement pt did take a shower yesterday and put on new gowns. Appears pleasant, smiling and waving at writer, but on approach states that she does not want to talk to writer 'I'm not talking to you.' Pt may be close to or at baseline, on our next interaction, will continue to assess need for continued hospitalization and provide JOHNS.   VPA level on 11/19 is 97.1

## 2022-11-22 PROCEDURE — 99232 SBSQ HOSP IP/OBS MODERATE 35: CPT

## 2022-11-22 RX ADMIN — DIVALPROEX SODIUM 500 MILLIGRAM(S): 500 TABLET, DELAYED RELEASE ORAL at 10:57

## 2022-11-22 RX ADMIN — RISPERIDONE 3 MILLIGRAM(S): 4 TABLET ORAL at 21:45

## 2022-11-22 RX ADMIN — RISPERIDONE 3 MILLIGRAM(S): 4 TABLET ORAL at 11:00

## 2022-11-22 RX ADMIN — DIVALPROEX SODIUM 1000 MILLIGRAM(S): 500 TABLET, DELAYED RELEASE ORAL at 21:44

## 2022-11-22 NOTE — BH INPATIENT PSYCHIATRY PROGRESS NOTE - NSBHCHARTREVIEWVS_PSY_A_CORE FT
Vital Signs Last 24 Hrs  T(C): 36.4 (11-22-22 @ 09:00), Max: 36.6 (11-21-22 @ 17:00)  T(F): 97.5 (11-22-22 @ 09:00), Max: 97.9 (11-21-22 @ 17:00)  HR: 97 (11-22-22 @ 09:00) (86 - 97)  BP: 106/69 (11-22-22 @ 09:00) (106/69 - 123/86)  BP(mean): --  RR: 18 (11-22-22 @ 09:00) (17 - 18)  SpO2: 98% (11-22-22 @ 09:00) (98% - 98%)

## 2022-11-22 NOTE — BH INPATIENT PSYCHIATRY PROGRESS NOTE - NSBHASSESSSUMMFT_PSY_ALL_CORE
This is a 50 yo  female unemployed, unmarried, reports to be currently domiciled with roommate. Medical hx significant wound on forehead and chronic anemia. Psychiatric hx pertinent for Schizoaffective Disorder, prior hospitalizations (last was 1 month ago per roommate). No known suicide attempts, no legal hx, no drug use. Patient presents to ED via EMS for a wound check and was noted to be making bizarre statements and in poor ability to care for self. Received prns of haldol and ativan while in ED. Patient transferred to [facility] and admitted 2pc.     On evaluation, the patient is irritable, agitated, uncooperative, not verbally redirectable and demonstrating poor behavioral control and disorganized thought processes.  On approach, she is yelling at a staff member calling him a "bum" because she was encouraged to take a shower.  The patient is unable to de-escalate and haldol 5 mg IM, lorazepam 2 mg IM and diphenhydramine 50 mg IM ONCE is ordered for agitation.      Plan:   - Risperdal 3mg bid  -  Depakote 500 mg qd, 1000mg qhs (vpa  11/8/22: 64.9)  - haldol 5 mg PO/IM, lorazepam 2 mg PO/IM, diphenhydramine 50 mg PO/IM q6h PRN for agitation; hold for qtc >500 ms.    11/7 received IM  prns on 11/6 for agitation, risperdal to be increased to 2mg bid, depakote/cmp level pending for tomorrow am, remains uncooperative, irritable  and dismissive  11/8 Compliant with med regimen, irritable, dismissive, psychotic  11/9 Retained by court for further treatment, risperdal increased to 2mg qd/3mg qhs for ongoing disorganization and flight of ideas  11/10 Highly irritable, unable to be engaged, taking medications.   11/11 Remains irritable, but is taking medications  11/14 Better related, improved mood, grossly organized, risperdal increased to 3mg bid  11/15 VPA increased to total of 1500mg qd due to labile mood  11/16 labile mood, has more moments of organized thoughts  11/17 remains unchanged

## 2022-11-22 NOTE — BH INPATIENT PSYCHIATRY PROGRESS NOTE - NSBHFUPINTERVALHXFT_PSY_A_CORE
Patient visible on the unit, in gowns, fair hygiene and grooming. Cooperative on pleasant on approach. Reported having abdominal discomfort earlier this morning which is resolving. No other medical concerns or complaints. Discharge focused, wanting to discuss when she would be discharged. We also discussed JOHNS options, and she stated that in the past she had been on a JOHNS and had had side effects such as changes to her teeth and menstrual cycle. She also expressed concerns about being able to get her JOHNS at her clinic. No reported si/hi/avh or PI. Better related, but easily becomes derailed  VPA level on 11/19 is 97.1

## 2022-11-22 NOTE — BH INPATIENT PSYCHIATRY PROGRESS NOTE - NSBHMETABOLIC_PSY_ALL_CORE_FT
BMI: BMI (kg/m2): 23.1 (11-01-22 @ 01:23)  HbA1c:   Glucose:   BP: 106/69 (11-22-22 @ 09:00) (106/69 - 124/76)  Lipid Panel:

## 2022-11-22 NOTE — BH TREATMENT PLAN - NSTXCAREGIVERAGREEMENT_PSY_P_CORE
No
Patient does not have family/caregiver involved in care
Patient does not have family/caregiver involved in care

## 2022-11-22 NOTE — BH TREATMENT PLAN - NSTXDCOPLKINTERMD_PSY_ALL_CORE
Psychopharmacology x15 minutes, will titrate meds ( depakote 500mg/1000mg and risperdal 3mg bid) as appropriate, Invega Sustenna to be administered

## 2022-11-22 NOTE — BH TREATMENT PLAN - NSTXPLANTHERAPYSESSIONSFT_PSY_ALL_CORE
11-15-22  Type of therapy: Creative arts therapy  Type of session: Group  Level of patient participation: Engaged  Duration of participation: 45 minutes  Therapy conducted by: Psych rehab  Therapy Summary: Pt. continues to attend groups regularly and has improved ability to engage appropriately; pt. is less prone to verbal outbursts and is more easily redirected;  
  11-08-22  Type of therapy: Creative arts therapy  Type of session: Group  Level of patient participation: Disruptive,Participates  Duration of participation: 60 minutes  Therapy conducted by: Psych rehab  Therapy Summary: Pt. has regularly participated in groups this week; pt. has demonstrated lack of control and inability to participate appropriately in directives;  
  11-15-22  Type of therapy: Creative arts therapy  Type of session: Group  Level of patient participation: Engaged  Duration of participation: 45 minutes  Therapy conducted by: Psych rehab  Therapy Summary: Pt. continues to attend groups regularly and has improved ability to engage appropriately; pt. is less prone to verbal outbursts and is more easily redirected;

## 2022-11-23 PROCEDURE — 99233 SBSQ HOSP IP/OBS HIGH 50: CPT

## 2022-11-23 RX ORDER — PALIPERIDONE 1.5 MG/1
234 TABLET, EXTENDED RELEASE ORAL ONCE
Refills: 0 | Status: COMPLETED | OUTPATIENT
Start: 2022-11-23 | End: 2022-11-23

## 2022-11-23 RX ORDER — HALOPERIDOL DECANOATE 100 MG/ML
5 INJECTION INTRAMUSCULAR ONCE
Refills: 0 | Status: COMPLETED | OUTPATIENT
Start: 2022-11-23 | End: 2022-11-23

## 2022-11-23 RX ORDER — DIPHENHYDRAMINE HCL 50 MG
50 CAPSULE ORAL ONCE
Refills: 0 | Status: COMPLETED | OUTPATIENT
Start: 2022-11-23 | End: 2022-11-23

## 2022-11-23 RX ADMIN — RISPERIDONE 3 MILLIGRAM(S): 4 TABLET ORAL at 22:14

## 2022-11-23 RX ADMIN — DIVALPROEX SODIUM 1000 MILLIGRAM(S): 500 TABLET, DELAYED RELEASE ORAL at 22:15

## 2022-11-23 RX ADMIN — DIVALPROEX SODIUM 500 MILLIGRAM(S): 500 TABLET, DELAYED RELEASE ORAL at 10:06

## 2022-11-23 RX ADMIN — RISPERIDONE 3 MILLIGRAM(S): 4 TABLET ORAL at 10:06

## 2022-11-23 RX ADMIN — Medication 2 MILLIGRAM(S): at 12:43

## 2022-11-23 RX ADMIN — Medication 50 MILLIGRAM(S): at 12:43

## 2022-11-23 NOTE — BH INPATIENT PSYCHIATRY PROGRESS NOTE - NSBHASSESSSUMMFT_PSY_ALL_CORE
This is a 50 yo  female unemployed, unmarried, reports to be currently domiciled with roommate. Medical hx significant wound on forehead and chronic anemia. Psychiatric hx pertinent for Schizoaffective Disorder, prior hospitalizations (last was 1 month ago per roommate). No known suicide attempts, no legal hx, no drug use. Patient presents to ED via EMS for a wound check and was noted to be making bizarre statements and in poor ability to care for self. Received prns of haldol and ativan while in ED. Patient transferred to [facility] and admitted 2pc.     On evaluation, the patient is irritable, agitated, uncooperative, not verbally redirectable and demonstrating poor behavioral control and disorganized thought processes.  On approach, she is yelling at a staff member calling him a "bum" because she was encouraged to take a shower.  The patient is unable to de-escalate and haldol 5 mg IM, lorazepam 2 mg IM and diphenhydramine 50 mg IM ONCE is ordered for agitation.      Plan:   - Risperdal 3mg bid  -  Depakote 500 mg qd, 1000mg qhs (vpa  11/8/22: 64.9)  - haldol 5 mg PO/IM, lorazepam 2 mg PO/IM, diphenhydramine 50 mg PO/IM q6h PRN for agitation; hold for qtc >500 ms.    11/7 received IM  prns on 11/6 for agitation, risperdal to be increased to 2mg bid, depakote/cmp level pending for tomorrow am, remains uncooperative, irritable  and dismissive  11/8 Compliant with med regimen, irritable, dismissive, psychotic  11/9 Retained by court for further treatment, risperdal increased to 2mg qd/3mg qhs for ongoing disorganization and flight of ideas  11/10 Highly irritable, unable to be engaged, taking medications.   11/11 Remains irritable, but is taking medications  11/14 Better related, improved mood, grossly organized, risperdal increased to 3mg bid  11/15 VPA increased to total of 1500mg qd due to labile mood  11/16 labile mood, has more moments of organized thoughts  11/17 remains unchanged

## 2022-11-23 NOTE — BH INPATIENT PSYCHIATRY PROGRESS NOTE - NSBHFUPINTERVALHXFT_PSY_A_CORE
Order for Invega Sustenna first loading dose in place. Patient visible on the unit, malodorous on approach, anxious to speak to writer about the Invega, begins to talk about the [place] court and that they didn't agree to Invega for her. As we begin to talk about medications, she becomes increasingly upset, cursing at writer and calling writer derogatory racial slurs and physically posturing towards writer. Unable to be verbally redirected or deescalated. IM medications (haldol 5mg/ativan 2mg/benadryl 50mg)   VPA level on 11/19 is 97.1

## 2022-11-23 NOTE — BH INPATIENT PSYCHIATRY PROGRESS NOTE - CURRENT MEDICATION
MEDICATIONS  (STANDING):  divalproex DR 1000 milliGRAM(s) Oral at bedtime  divalproex  milliGRAM(s) Oral daily  haloperidol    Injectable 5 milliGRAM(s) IntraMuscular once  LORazepam   Injectable 2 milliGRAM(s) IntraMuscular once  paliperidone Injectable, Long Acting 234 milliGRAM(s) IntraMuscular once  risperiDONE   Tablet 3 milliGRAM(s) Oral two times a day    MEDICATIONS  (PRN):  diphenhydrAMINE 50 milliGRAM(s) Oral every 6 hours PRN Rash and/or Itching  diphenhydrAMINE Injectable 50 milliGRAM(s) IntraMuscular once PRN agitation  haloperidol     Tablet 5 milliGRAM(s) Oral every 6 hours PRN agitation  hydrOXYzine hydrochloride 50 milliGRAM(s) Oral every 6 hours PRN Anxiety

## 2022-11-23 NOTE — BH INPATIENT PSYCHIATRY PROGRESS NOTE - NSBHCHARTREVIEWVS_PSY_A_CORE FT
Vital Signs Last 24 Hrs  T(C): 36.9 (11-22-22 @ 17:05), Max: 36.9 (11-22-22 @ 17:05)  T(F): 98.4 (11-22-22 @ 17:05), Max: 98.4 (11-22-22 @ 17:05)  HR: 98 (11-22-22 @ 17:05) (98 - 98)  BP: 96/63 (11-22-22 @ 17:05) (96/63 - 96/63)  BP(mean): --  RR: 18 (11-22-22 @ 17:05) (18 - 18)  SpO2: 97% (11-22-22 @ 17:05) (97% - 97%)

## 2022-11-23 NOTE — BH INPATIENT PSYCHIATRY PROGRESS NOTE - NSBHMETABOLIC_PSY_ALL_CORE_FT
BMI: BMI (kg/m2): 23.1 (11-01-22 @ 01:23)  HbA1c:   Glucose:   BP: 96/63 (11-22-22 @ 17:05) (96/63 - 124/76)  Lipid Panel:

## 2022-11-23 NOTE — BH INPATIENT PSYCHIATRY PROGRESS NOTE - PRN MEDS
MEDICATIONS  (PRN):  diphenhydrAMINE 50 milliGRAM(s) Oral every 6 hours PRN Rash and/or Itching  diphenhydrAMINE Injectable 50 milliGRAM(s) IntraMuscular once PRN agitation  haloperidol     Tablet 5 milliGRAM(s) Oral every 6 hours PRN agitation  hydrOXYzine hydrochloride 50 milliGRAM(s) Oral every 6 hours PRN Anxiety

## 2022-11-24 RX ADMIN — DIVALPROEX SODIUM 1000 MILLIGRAM(S): 500 TABLET, DELAYED RELEASE ORAL at 21:58

## 2022-11-24 RX ADMIN — DIVALPROEX SODIUM 500 MILLIGRAM(S): 500 TABLET, DELAYED RELEASE ORAL at 10:28

## 2022-11-24 RX ADMIN — RISPERIDONE 3 MILLIGRAM(S): 4 TABLET ORAL at 10:28

## 2022-11-24 RX ADMIN — RISPERIDONE 3 MILLIGRAM(S): 4 TABLET ORAL at 21:58

## 2022-11-25 PROCEDURE — 99231 SBSQ HOSP IP/OBS SF/LOW 25: CPT

## 2022-11-25 RX ORDER — PALIPERIDONE 1.5 MG/1
234 TABLET, EXTENDED RELEASE ORAL ONCE
Refills: 0 | Status: COMPLETED | OUTPATIENT
Start: 2022-11-25 | End: 2022-11-25

## 2022-11-25 RX ORDER — PALIPERIDONE 1.5 MG/1
156 TABLET, EXTENDED RELEASE ORAL ONCE
Refills: 0 | Status: COMPLETED | OUTPATIENT
Start: 2022-11-30 | End: 2022-11-30

## 2022-11-25 RX ADMIN — RISPERIDONE 3 MILLIGRAM(S): 4 TABLET ORAL at 22:02

## 2022-11-25 RX ADMIN — RISPERIDONE 3 MILLIGRAM(S): 4 TABLET ORAL at 11:45

## 2022-11-25 RX ADMIN — PALIPERIDONE 234 MILLIGRAM(S): 1.5 TABLET, EXTENDED RELEASE ORAL at 11:54

## 2022-11-25 RX ADMIN — DIVALPROEX SODIUM 1000 MILLIGRAM(S): 500 TABLET, DELAYED RELEASE ORAL at 22:02

## 2022-11-25 RX ADMIN — DIVALPROEX SODIUM 500 MILLIGRAM(S): 500 TABLET, DELAYED RELEASE ORAL at 10:23

## 2022-11-25 NOTE — BH INPATIENT PSYCHIATRY PROGRESS NOTE - NSBHASSESSSUMMFT_PSY_ALL_CORE
This is a 50 yo  female unemployed, unmarried, reports to be currently domiciled with roommate. Medical hx significant wound on forehead and chronic anemia. Psychiatric hx pertinent for Schizoaffective Disorder, prior hospitalizations (last was 1 month ago per roommate). No known suicide attempts, no legal hx, no drug use. Patient presents to ED via EMS for a wound check and was noted to be making bizarre statements and in poor ability to care for self. Received prns of haldol and ativan while in ED. Patient transferred to [facility] and admitted 2pc.     On evaluation, the patient is irritable, agitated, uncooperative, not verbally redirectable and demonstrating poor behavioral control and disorganized thought processes.  On approach, she is yelling at a staff member calling him a "bum" because she was encouraged to take a shower.  The patient is unable to de-escalate and haldol 5 mg IM, lorazepam 2 mg IM and diphenhydramine 50 mg IM ONCE is ordered for agitation.      Plan:   - Risperdal 3mg bid. Invega Sustenna 234mg given on 11/25  -  Depakote 500 mg qd, 1000mg qhs (vpa  11/8/22: 64.9)  - haldol 5 mg PO/IM, lorazepam 2 mg PO/IM, diphenhydramine 50 mg PO/IM q6h PRN for agitation; hold for qtc >500 ms.    11/7 received IM  prns on 11/6 for agitation, risperdal to be increased to 2mg bid, depakote/cmp level pending for tomorrow am, remains uncooperative, irritable  and dismissive  11/8 Compliant with med regimen, irritable, dismissive, psychotic  11/9 Retained by court for further treatment, risperdal increased to 2mg qd/3mg qhs for ongoing disorganization and flight of ideas  11/10 Highly irritable, unable to be engaged, taking medications.   11/11 Remains irritable, but is taking medications  11/14 Better related, improved mood, grossly organized, risperdal increased to 3mg bid  11/15 VPA increased to total of 1500mg qd due to labile mood  11/16 labile mood, has more moments of organized thoughts  11/17 remains unchanged

## 2022-11-25 NOTE — BH INPATIENT PSYCHIATRY PROGRESS NOTE - CURRENT MEDICATION
MEDICATIONS  (STANDING):  divalproex DR 1000 milliGRAM(s) Oral at bedtime  divalproex  milliGRAM(s) Oral daily  risperiDONE   Tablet 3 milliGRAM(s) Oral two times a day    MEDICATIONS  (PRN):  diphenhydrAMINE 50 milliGRAM(s) Oral every 6 hours PRN Rash and/or Itching  haloperidol     Tablet 5 milliGRAM(s) Oral every 6 hours PRN agitation  hydrOXYzine hydrochloride 50 milliGRAM(s) Oral every 6 hours PRN Anxiety

## 2022-11-25 NOTE — BH INPATIENT PSYCHIATRY PROGRESS NOTE - NSBHFUPINTERVALHXFT_PSY_A_CORE
Patient refusing to engage with writer, remains hostile on approach. Noted to be malodorous, smelling of urine. Received 1st loading dose of Invega Sustenna 234mg today.   VPA level on 11/19 is 97.1

## 2022-11-26 RX ADMIN — DIVALPROEX SODIUM 500 MILLIGRAM(S): 500 TABLET, DELAYED RELEASE ORAL at 10:09

## 2022-11-26 RX ADMIN — DIVALPROEX SODIUM 1000 MILLIGRAM(S): 500 TABLET, DELAYED RELEASE ORAL at 21:09

## 2022-11-26 RX ADMIN — RISPERIDONE 3 MILLIGRAM(S): 4 TABLET ORAL at 21:09

## 2022-11-26 RX ADMIN — RISPERIDONE 3 MILLIGRAM(S): 4 TABLET ORAL at 10:09

## 2022-11-27 RX ADMIN — DIVALPROEX SODIUM 500 MILLIGRAM(S): 500 TABLET, DELAYED RELEASE ORAL at 09:54

## 2022-11-27 RX ADMIN — RISPERIDONE 3 MILLIGRAM(S): 4 TABLET ORAL at 09:54

## 2022-11-27 RX ADMIN — RISPERIDONE 3 MILLIGRAM(S): 4 TABLET ORAL at 21:57

## 2022-11-27 RX ADMIN — DIVALPROEX SODIUM 1000 MILLIGRAM(S): 500 TABLET, DELAYED RELEASE ORAL at 21:57

## 2022-11-28 PROCEDURE — 99231 SBSQ HOSP IP/OBS SF/LOW 25: CPT

## 2022-11-28 RX ADMIN — RISPERIDONE 3 MILLIGRAM(S): 4 TABLET ORAL at 21:49

## 2022-11-28 RX ADMIN — RISPERIDONE 3 MILLIGRAM(S): 4 TABLET ORAL at 09:34

## 2022-11-28 RX ADMIN — DIVALPROEX SODIUM 1000 MILLIGRAM(S): 500 TABLET, DELAYED RELEASE ORAL at 21:48

## 2022-11-28 RX ADMIN — DIVALPROEX SODIUM 500 MILLIGRAM(S): 500 TABLET, DELAYED RELEASE ORAL at 09:34

## 2022-11-28 NOTE — BH INPATIENT PSYCHIATRY PROGRESS NOTE - NSBHMETABOLIC_PSY_ALL_CORE_FT
BMI: BMI (kg/m2): 23.1 (11-01-22 @ 01:23)  HbA1c:   Glucose:   BP: 120/69 (11-27-22 @ 10:02) (120/69 - 126/85)  Lipid Panel:

## 2022-11-28 NOTE — BH INPATIENT PSYCHIATRY PROGRESS NOTE - NSBHFUPINTERVALHXFT_PSY_A_CORE
much more pleasant than in previous descriptions; Not endorsing  suicidal or homicidal ideation intent or plans; no mention of auditory or visual hallucinations but odd manner and affect.  Focused on discharge; wants to leave.  Better ADLs

## 2022-11-28 NOTE — BH INPATIENT PSYCHIATRY PROGRESS NOTE - NSBHASSESSSUMMFT_PSY_ALL_CORE
This is a 50 yo  female unemployed, unmarried, reports to be currently domiciled with roommate. Medical hx significant wound on forehead and chronic anemia. Psychiatric hx pertinent for Schizoaffective Disorder, prior hospitalizations (last was 1 month ago per roommate). No known suicide attempts, no legal hx, no drug use. Patient presents to ED via EMS for a wound check and was noted to be making bizarre statements and in poor ability to care for self. Received prns of haldol and ativan while in ED. Patient transferred to [facility] and admitted 2pc.     On evaluation, the patient is irritable, agitated, uncooperative, not verbally redirectable and demonstrating poor behavioral control and disorganized thought processes.  On approach, she is yelling at a staff member calling him a "bum" because she was encouraged to take a shower.  The patient is unable to de-escalate and haldol 5 mg IM, lorazepam 2 mg IM and diphenhydramine 50 mg IM ONCE is ordered for agitation.      Plan:   - Risperdal 3mg bid. Invega Sustenna 234mg given on 11/25  -  Depakote 500 mg qd, 1000mg qhs (vpa  11/8/22: 64.9)  - haldol 5 mg PO/IM, lorazepam 2 mg PO/IM, diphenhydramine 50 mg PO/IM q6h PRN for agitation; hold for qtc >500 ms.    11/7 received IM  prns on 11/6 for agitation, risperdal to be increased to 2mg bid, depakote/cmp level pending for tomorrow am, remains uncooperative, irritable  and dismissive  11/8 Compliant with med regimen, irritable, dismissive, psychotic  11/9 Retained by court for further treatment, risperdal increased to 2mg qd/3mg qhs for ongoing disorganization and flight of ideas  11/10 Highly irritable, unable to be engaged, taking medications.   11/11 Remains irritable, but is taking medications  11/14 Better related, improved mood, grossly organized, risperdal increased to 3mg bid  11/15 VPA increased to total of 1500mg qd due to labile mood  11/16 labile mood, has more moments of organized thoughts  11/17 remains unchanged  ;;11/28: while irritable recently today seems improved on current regime with lowered irritability  Current medications diphenhydrAMINE 50 milliGRAM(s) Oral every 6 hours PRN  divalproex  milliGRAM(s) Oral daily for mood  divalproex DR 1000 milliGRAM(s) Oral at bedtime for mood  haloperidol     Tablet 5 milliGRAM(s) Oral every 6 hours PRN  hydrOXYzine hydrochloride 50 milliGRAM(s) Oral every 6 hours PRN  risperiDONE   Tablet 3 milliGRAM(s) Oral two times a day for psychosis

## 2022-11-29 PROCEDURE — 99231 SBSQ HOSP IP/OBS SF/LOW 25: CPT

## 2022-11-29 RX ADMIN — RISPERIDONE 3 MILLIGRAM(S): 4 TABLET ORAL at 10:09

## 2022-11-29 RX ADMIN — DIVALPROEX SODIUM 1000 MILLIGRAM(S): 500 TABLET, DELAYED RELEASE ORAL at 21:35

## 2022-11-29 RX ADMIN — RISPERIDONE 3 MILLIGRAM(S): 4 TABLET ORAL at 21:35

## 2022-11-29 RX ADMIN — DIVALPROEX SODIUM 500 MILLIGRAM(S): 500 TABLET, DELAYED RELEASE ORAL at 10:09

## 2022-11-29 NOTE — BH INPATIENT PSYCHIATRY PROGRESS NOTE - NSBHCHARTREVIEWVS_PSY_A_CORE FT
Vital Signs Last 24 Hrs  T(C): 36.5 (11-29-22 @ 10:05), Max: 36.5 (11-29-22 @ 10:05)  T(F): 97.7 (11-29-22 @ 10:05), Max: 97.7 (11-29-22 @ 10:05)  HR: 102 (11-29-22 @ 10:05) (102 - 102)  BP: 109/64 (11-29-22 @ 10:05) (109/64 - 109/64)  BP(mean): --  RR: 18 (11-29-22 @ 10:05) (18 - 18)  SpO2: 98% (11-29-22 @ 10:05) (98% - 98%)

## 2022-11-29 NOTE — BH TREATMENT PLAN - NSTXPSYCHOINTERPSY_PSY_ALL_CORE
Psychopharmacology x15 minutes, will titrate meds as appropriate

## 2022-11-29 NOTE — BH TREATMENT PLAN - NSTXPATIENTAGREEMENT_PSY_ALL_CORE
Yes
Patient unable to participate
Yes

## 2022-11-29 NOTE — ED ADULT NURSE NOTE - HIV OFFER
[facility]  Department of Emergency Medicine   ED  Encounter Note  Admit Date/RoomTime: 2022 12:57 AM  ED Room: [place] [address]    NAME: Lauren Bowden  : 1956  MRN: [id number]     Chief Complaint:  Urinary Retention (Pt comes to the ED with c/o urinary retention. Pt had a knee replacement at our facility today and has not voided since prior to surgery. Pt had no complications that they are aware of during surgery. )    History of Present Illness       Lauren Bowden is a 73 y.o. old male who presents to the emergency department by private vehicle, for urinary retention, which occured several hour(s) prior to arrival.  Since onset the symptoms have been persistent and moderate in severity. Symptoms are associated with pelvic pressure. Lauren Bowden has a history of no prior or recurrent UTI's. He denies any chest pain, shortness of breath, fever, chills, nausea, vomiting, diarrhea, headache or syncopal episodes. Patient's wife is here and does also act as historian. She reports that he did have a left total knee replacement via Dr. Brenda Waddell this morning. She reports that she does not believe that they put a catheter in him but he did receive an epidural block and nerve block. He states that he is unsure if BP before leaving the hospital or not. Patient reports that he does have a lot of enlarged prostate but has not had to have a catheter prior. ROS   Pertinent positives and negatives are stated within HPI, all other systems reviewed and are negative. Past Medical History:  has a past medical history of Cancer ([address] Hyperlipidemia, and Other specified hypothyroidism. Surgical History:  has a past surgical history that includes Tonsillectomy; Knee arthroscopy (Right); Appendectomy; skin biopsy; and Dental surgery. Social History:  reports that he has never smoked. He has never been exposed to tobacco smoke.  He has never used smokeless tobacco. He reports that he does not drink alcohol and does not use drugs. Family History: family history is not on file. Allergies: Patient has no known allergies. Physical Exam   Oxygen Saturation Interpretation: Normal.        ED Triage Vitals   BP Temp Temp Source Heart Rate Resp SpO2 Height Weight   11/29/22 0019 11/28/22 2252 11/28/22 2252 11/28/22 2252 11/28/22 2252 11/28/22 2252 11/29/22 0045 11/29/22 0045   (!) 137/122 97.6 °F (36.4 °C) Infrared 73 18 99 % 5' 10\" (1.778 m) 180 lb (81.6 kg)         Constitutional:  Alert, development consistent with age. HEENT:  NC/NT. Airway patent. Neck:  Normal ROM. Supple. Respiratory:  Lungs Clear to auscultation and breath sounds equal.  CV:  Regular rate and rhythm, normal heart sounds, without pathological murmurs, ectopy, gallops, or rubs. GI:  normal appearing, slightly-distended with no visible hernias. Bowel sounds: normal bowel sounds. Tenderness: There is mild tenderness present - located in the suprapubic region. , Jaquez's Sign: negative, McBurney's Sign: negative, Rovsing's Sign: negative. Liver: non-tender. Spleen:  non-tender. : (chaperone present during examination). Catheter placed via nursing staff with myself present. No abnormalities noted. Back: CVA Tenderness: No CVA tenderness. Integument:  Normal turgor. Warm, dry, without visible rash, unless noted elsewhere. Lymphatics: No lymphangitis or adenopathy noted. Neurological:  Oriented. Motor functions intact.     Lab / Imaging Results   (All laboratory and radiology results have been personally reviewed by myself)  Labs:  Results for orders placed or performed during the hospital encounter of 11/29/22   CBC with Auto Differential   Result Value Ref Range    WBC 14.0 (H) 4.5 - 11.5 E9/L    RBC 3.89 3.80 - 5.80 E12/L    Hemoglobin 11.4 (L) 12.5 - 16.5 g/dL    Hematocrit 34.7 (L) 37.0 - 54.0 %    MCV 89.2 80.0 - 99.9 fL    MCH 29.3 26.0 - 35.0 pg MCHC 32.9 32.0 - 34.5 %    RDW 12.9 11.5 - 15.0 fL    Platelets 292 369 - 025 E9/L    MPV 9.7 7.0 - 12.0 fL    Neutrophils % 79.7 43.0 - 80.0 %    Immature Granulocytes % 0.4 0.0 - 5.0 %    Lymphocytes % 9.4 (L) 20.0 - 42.0 %    Monocytes % 10.3 2.0 - 12.0 %    Eosinophils % 0.1 0.0 - 6.0 %    Basophils % 0.1 0.0 - 2.0 %    Neutrophils Absolute 11.14 (H) 1.80 - 7.30 E9/L    Immature Granulocytes # 0.06 E9/L    Lymphocytes Absolute 1.31 (L) 1.50 - 4.00 E9/L    Monocytes Absolute 1.44 (H) 0.10 - 0.95 E9/L    Eosinophils Absolute 0.01 (L) 0.05 - 0.50 E9/L    Basophils Absolute 0.02 0.00 - 0.20 E9/L   CMP   Result Value Ref Range    Sodium 132 132 - 146 mmol/L    Potassium 4.6 3.5 - 5.0 mmol/L    Chloride 98 98 - 107 mmol/L    CO2 22 22 - 29 mmol/L    Anion Gap 12 7 - 16 mmol/L    Glucose 166 (H) 74 - 99 mg/dL    BUN 25 (H) 6 - 23 mg/dL    Creatinine 1.0 0.7 - 1.2 mg/dL    Est, Glom Filt Rate >60 >=60 mL/min/1.73    Calcium 9.1 8.6 - 10.2 mg/dL    Total Protein 5.9 (L) 6.4 - 8.3 g/dL    Albumin 3.8 3.5 - 5.2 g/dL    Total Bilirubin 0.4 0.0 - 1.2 mg/dL    Alkaline Phosphatase 41 40 - 129 U/L    ALT 6 0 - 40 U/L    AST 15 0 - 39 U/L   Lipase   Result Value Ref Range    Lipase 9 (L) 13 - 60 U/L   Lactic Acid   Result Value Ref Range    Lactic Acid 2.8 (H) 0.5 - 2.2 mmol/L   Urinalysis   Result Value Ref Range    Color, UA Yellow Straw/Yellow    Clarity, UA Clear Clear    Glucose, Ur Negative Negative mg/dL    Bilirubin Urine Negative Negative    Ketones, Urine TRACE (A) Negative mg/dL    Specific Gravity, UA 1.025 1.005 - 1.030    Blood, Urine TRACE (A) Negative    pH, UA 6.0 5.0 - 9.0    Protein, UA Negative Negative mg/dL    Urobilinogen, Urine 0.2 <2.0 E.U./dL    Nitrite, Urine Negative Negative    Leukocyte Esterase, Urine Negative Negative   Microscopic Urinalysis   Result Value Ref Range    WBC, UA 1-3 0 - 5 /HPF    RBC, UA 10-20 (A) 0 - 2 /HPF    Bacteria, UA NONE SEEN None Seen /HPF       Imaging:   All Radiology results interpreted by Radiologist unless otherwise noted. No orders to display     ED Course / Medical Decision Making     Medications   0.9 % sodium chloride bolus (0 mLs IntraVENous Stopped 11/29/22 0408)   ondansetron (ZOFRAN) injection 4 mg (4 mg IntraVENous Given 11/29/22 0323)        Re-examination:  11/29/22       Time: 1:55 AM   Patients symptoms are worsening. Patient was noted to have large episode of emesis or within ED setting at this time. Orders placed for further work-up and antiemetic medication. Time: 3:08 AM   Patient was reevaluated at this time. He has drained over 800 cc of urine in the catheter collection bag which is yellow and demonstrates no signs of bright red blood or cloudy appearance. Patient reports that he feels markedly better and that he has had no episodes of emesis since receiving catheter placement. Results available thus far were discussed. Consults:   None    Procedures:   None    Medical Decision Making:    Patient reported to ER for evaluation of urinary retention for the past several hours after undergoing total knee replacement earlier this morning. Based on reported history and physical exam findings order was placed for Jean catheter. Prior to Jean catheter being placed patient did become ill and had several episodes of emesis and therefore blood work orders were placed. Patient was given IV fluids and Zofran which she responded well to. Urinalysis demonstrated no signs of infection. CBC demonstrated slightly elevated white blood cell count without significant anemia which I believe is secondary to inflammation/pain response. CMP demonstrated slightly elevated glucose at 166, BUN at 25 and slightly decreased protein at 5.9. Lactic acid was slightly elevated however this does correlate with the patient's recent episodes of vomiting as well as reported history of surgery this morning.   Patient is appropriate for discharged home with follow-up to his primary care provider. Patient and wife did report that they have a home health nurse presenting to the home tomorrow morning for evaluation. They did verbalize to me that they would feel more comfortable leaving the catheter in place which I am agreeable with as patient is at risk for repeat urinary retention should the catheter to be removed at this time. Patient is alert and oriented, no acute distress, afebrile, nontachycardic, nonhypoxic and hemodynamically stable. He is recommended to return to ER with new or worsening symptoms. Patient and wife understandable and agreeable to plan. Plan of Care/Counseling:  JESSICA Elkins reviewed today's visit with the patient and spouse / life partner in addition to providing specific details for the plan of care and counseling regarding the diagnosis and prognosis. Questions are answered at this time and are agreeable with the plan. Assessment      1. Urinary retention    2. Nausea and vomiting, unspecified vomiting type      Plan   Disposition:   Discharged home. Patient condition is good    New Medications     Discharge Medication List as of 11/29/2022  4:11 AM        START taking these medications    Details   ondansetron (ZOFRAN) 4 MG tablet Take 1 tablet by mouth every 8 hours as needed for Nausea or Vomiting, Disp-9 tablet, R-0Normal           Electronically signed by JESSICA Elkins   DD: 11/29/22  **This report was transcribed using voice recognition software. Every effort was made to ensure accuracy; however, inadvertent computerized transcription errors may be present.   END OF ED PROVIDER NOTE          Jen Elkins  11/29/22 9563 Opt out

## 2022-11-29 NOTE — BH TREATMENT PLAN - NSTXOTHERINTERMD_PSY_ALL_CORE
Will coordinate with wound care Will coordinate with wound care, has not been allowing care or even visualization of wound

## 2022-11-29 NOTE — BH INPATIENT PSYCHIATRY PROGRESS NOTE - NSBHASSESSSUMMFT_PSY_ALL_CORE
This is a 50 yo  female unemployed, unmarried, reports to be currently domiciled with roommate. Medical hx significant wound on forehead and chronic anemia. Psychiatric hx pertinent for Schizoaffective Disorder, prior hospitalizations (last was 1 month ago per roommate). No known suicide attempts, no legal hx, no drug use. Patient presents to ED via EMS for a wound check and was noted to be making bizarre statements and in poor ability to care for self. Received prns of haldol and ativan while in ED. Patient transferred to [facility] and admitted 2pc.     On evaluation, the patient is irritable, agitated, uncooperative, not verbally redirectable and demonstrating poor behavioral control and disorganized thought processes.  On approach, she is yelling at a staff member calling him a "bum" because she was encouraged to take a shower.  The patient is unable to de-escalate and haldol 5 mg IM, lorazepam 2 mg IM and diphenhydramine 50 mg IM ONCE is ordered for agitation.      Plan:   - Risperdal 3mg bid. Invega Sustenna 234mg given on 11/25, is due for 156mg on 11/30  -  Depakote 500 mg qd, 1000mg qhs (vpa  11/8/22: 64.9, 11/19/22: 97.1 )  - haldol 5 mg PO/IM, lorazepam 2 mg PO/IM, diphenhydramine 50 mg PO/IM q6h PRN for agitation; hold for qtc >500 ms.    11/7 received IM  prns on 11/6 for agitation, risperdal to be increased to 2mg bid, depakote/cmp level pending for tomorrow am, remains uncooperative, irritable  and dismissive  11/8 Compliant with med regimen, irritable, dismissive, psychotic  11/9 Retained by court for further treatment, risperdal increased to 2mg qd/3mg qhs for ongoing disorganization and flight of ideas  11/10 Highly irritable, unable to be engaged, taking medications.   11/11 Remains irritable, but is taking medications  11/14 Better related, improved mood, grossly organized, risperdal increased to 3mg bid  11/15 VPA increased to total of 1500mg qd due to labile mood  11/16 labile mood, has more moments of organized thoughts  11/17 remains unchanged  ;;11/28: while irritable recently today seems improved on current regime with lowered irritability  Current medications diphenhydrAMINE 50 milliGRAM(s) Oral every 6 hours PRN  divalproex  milliGRAM(s) Oral daily for mood  divalproex DR 1000 milliGRAM(s) Oral at bedtime for mood  haloperidol     Tablet 5 milliGRAM(s) Oral every 6 hours PRN  hydrOXYzine hydrochloride 50 milliGRAM(s) Oral every 6 hours PRN  risperiDONE   Tablet 3 milliGRAM(s) Oral two times a day for psychosis

## 2022-11-29 NOTE — BH INPATIENT PSYCHIATRY PROGRESS NOTE - NSBHFUPINTERVALHXFT_PSY_A_CORE
Patient visible on the unit, declining to speak to writer. MAR review indicates that pt has been compliant with medications. Is due for invega sustenna 156mg on 11/30. Discharge planned for 12/2/22    VPA 11/19/22: 97.1

## 2022-11-29 NOTE — BH TREATMENT PLAN - NSTXPATIENTPARTICIPATE_PSY_ALL_CORE
No, patient unwilling to participate
Patient participated in identification of needs/problems/goals for treatment/Patient participated in defining interventions/Patient participated in development of after care plan
No, patient unwilling to participate
Patient participated in identification of needs/problems/goals for treatment/Patient participated in defining interventions/Patient participated in development of after care plan
No, patient unwilling to participate
Patient participated in identification of needs/problems/goals for treatment/Patient participated in defining interventions/Patient participated in development of after care plan
Patient participated in identification of needs/problems/goals for treatment/Patient participated in defining interventions/Patient participated in development of after care plan

## 2022-11-29 NOTE — BH TREATMENT PLAN - NSTXPLANSTARTDATE_PSY_ALL_CORE
22-Nov-2022 16:28
29-Nov-2022 16:04
02-Nov-2022 17:13
02-Nov-2022 09:26
07-Nov-2022 09:43
09-Nov-2022 21:00
16-Nov-2022 13:47

## 2022-11-29 NOTE — BH TREATMENT PLAN - NSTXPSYCHOGOAL_PSY_ALL_CORE
Will verbalize 1 strategy to successfully cope with psychotic symptoms
Will identify 2 coping skills that help mitigate hallucinations
Will verbalize 1 strategy to successfully cope with psychotic symptoms
Will identify 2 coping skills that help mitigate hallucinations
Will identify 2 coping skills that help mitigate hallucinations
Will verbalize 1 strategy to successfully cope with psychotic symptoms
Will verbalize 1 strategy to successfully cope with psychotic symptoms

## 2022-11-29 NOTE — BH TREATMENT PLAN - NSTXPSYCHOINTERPR_PSY_ALL_CORE
Pt will continue to be invited and encouraged to attend all offered groups
Pt will be invited and encouraged to attend all offered groups
Pt will be invited and encouraged to attend all offered groups
Pt will continue to be invited and encouraged to attend all offered groups
Pt will be invited and encouraged to attend all offered groups
Pt will continue to be invited and encouraged to attend all offered groups
Pt will continue to be invited and encouraged to attend all offered groups

## 2022-11-29 NOTE — BH TREATMENT PLAN - NSDCCRITERIA_PSY_ALL_CORE
Improvement in mood and psychotic symptoms
Improvement in psychotic features
Improvement in mood and psychotic symptoms

## 2022-11-29 NOTE — BH TREATMENT PLAN - NSTXDCOPLKGOAL_PSY_ALL_CORE
Other...
Will agree to consider an appropriate level of outpatient care
Will agree to consider an appropriate level of outpatient care
Other...

## 2022-11-29 NOTE — BH TREATMENT PLAN - NSBHPRIMARYDX_PSY_ALL_CORE
Schizoaffective disorder    

## 2022-11-29 NOTE — BH TREATMENT PLAN - NSTXPSYCHOINTERRN_PSY_ALL_CORE
patient was encouraged to join groups ,verbalize feelings to staff and comply with meds
Patient will verbalize understanding of indication, S/E of medication to promote adherence to medication regimen. Patient will identify coping skills to manage psychotic behavior.
Patient will verbalize understanding of indication, S/E of medication to promote adherence to medication regimen. Patient will identify coping skills to manage psychotic behavior.
patient was encouraged to join groups ,verbalize feelings to staff and comply with meds
Patient will verbalize understanding of indication, S/E of medication to promote adherence to medication regimen. Patient will identify coping skills to manage psychotic behavior.

## 2022-11-29 NOTE — BH INPATIENT PSYCHIATRY PROGRESS NOTE - NSBHMETABOLIC_PSY_ALL_CORE_FT
BMI: BMI (kg/m2): 23.1 (11-01-22 @ 01:23)  HbA1c:   Glucose:   BP: 109/64 (11-29-22 @ 10:05) (109/64 - 126/85)  Lipid Panel:

## 2022-11-29 NOTE — BH TREATMENT PLAN - NSTXDCOPLKINTERSW_PSY_ALL_CORE
Liaise with family and collateral providers weekly for 1 hour or as needed for discharge planning purposes.

## 2022-11-30 VITALS
SYSTOLIC BLOOD PRESSURE: 124 MMHG | HEART RATE: 89 BPM | OXYGEN SATURATION: 98 % | DIASTOLIC BLOOD PRESSURE: 79 MMHG | RESPIRATION RATE: 18 BRPM | TEMPERATURE: 98 F

## 2022-11-30 PROCEDURE — 99231 SBSQ HOSP IP/OBS SF/LOW 25: CPT

## 2022-11-30 RX ADMIN — DIVALPROEX SODIUM 1000 MILLIGRAM(S): 500 TABLET, DELAYED RELEASE ORAL at 21:26

## 2022-11-30 RX ADMIN — RISPERIDONE 3 MILLIGRAM(S): 4 TABLET ORAL at 21:26

## 2022-11-30 RX ADMIN — DIVALPROEX SODIUM 500 MILLIGRAM(S): 500 TABLET, DELAYED RELEASE ORAL at 11:06

## 2022-11-30 RX ADMIN — PALIPERIDONE 156 MILLIGRAM(S): 1.5 TABLET, EXTENDED RELEASE ORAL at 15:30

## 2022-11-30 RX ADMIN — RISPERIDONE 3 MILLIGRAM(S): 4 TABLET ORAL at 11:07

## 2022-11-30 NOTE — BH INPATIENT PSYCHIATRY PROGRESS NOTE - NSBHMSELANG_PSY_A_CORE
No abnormalities noted
Unable to assess
No abnormalities noted
No abnormalities noted

## 2022-11-30 NOTE — BH INPATIENT PSYCHIATRY PROGRESS NOTE - NSBHMSEATTEN_PSY_A_CORE
Impaired
Normal
Normal
Impaired
Normal
Normal
Unable to assess
Impaired
Impaired
Normal
Normal
Impaired
Impaired

## 2022-11-30 NOTE — BH INPATIENT PSYCHIATRY PROGRESS NOTE - NSBHMSEBEHAV_PSY_A_CORE
Cooperative
Cooperative
Uncooperative/Hostile
Hostile
Cooperative
Hostile
Uncooperative
Cooperative
Uncooperative/Hostile
Uncooperative/Hostile
Hostile
Cooperative
Uncooperative/Hostile
Uncooperative/Hostile
Cooperative

## 2022-11-30 NOTE — BH INPATIENT PSYCHIATRY PROGRESS NOTE - NSBHMSEINTELL_PSY_A_CORE
Unable to assess
Average
Average
Unable to assess
Average
Unable to assess
Average
Unable to assess
Average
Average
Unable to assess
Average
Unable to assess
Unable to assess

## 2022-11-30 NOTE — BH INPATIENT PSYCHIATRY PROGRESS NOTE - NSBHMSESPEECH_PSY_A_CORE
soft hard to understand at times/Abnormal as indicated, otherwise normal...
soft hard to understand at times/Normal volume, rate, productivity, spontaneity and articulation
soft hard to understand at times/Abnormal as indicated, otherwise normal...
soft hard to understand at times/Normal volume, rate, productivity, spontaneity and articulation
soft hard to understand at times/Abnormal as indicated, otherwise normal...
soft hard to understand at times/Normal volume, rate, productivity, spontaneity and articulation

## 2022-11-30 NOTE — BH INPATIENT PSYCHIATRY PROGRESS NOTE - NSTXDCOPLKDATETRGT_PSY_ALL_CORE
08-Nov-2022
14-Nov-2022
22-Nov-2022
15-Nov-2022
22-Nov-2022
22-Nov-2022
15-Nov-2022
22-Nov-2022
15-Nov-2022
29-Nov-2022
22-Nov-2022
06-Dec-2022
08-Nov-2022
08-Nov-2022
15-Nov-2022
15-Nov-2022
22-Nov-2022
29-Nov-2022
06-Dec-2022
29-Nov-2022

## 2022-11-30 NOTE — BH INPATIENT PSYCHIATRY PROGRESS NOTE - NSBHCHARTREVIEWINVESTIGATE_PSY_A_CORE FT
ACC: [id number] EXAM:  CT BRAIN                          PROCEDURE DATE:  10/31/2022          INTERPRETATION:  INDICATION:  Status post trauma with head injury.     Headache.  TECHNIQUE:  A non contrast 2.5mm axial CT study of the brain was   performed from skull base to vertex. Coronal and sagittal reformations   were generated from the axial data.  COMPARISON EXAMINATION:  no prior.    FINDINGS:    HEMISPHERES:No acute intracerebral hemorrhage or contusion is identified.    No mass or space occupying lesion is noted.  No acute ischemic changes   are suggested.  VENTRICLES:  Midline and normal in size.  POSTERIOR FOSSA:  The brain stem and cerebellum are unremarkable.  No CP   angle lesion noted.  EXTRACEREBRAL SPACES:  No subdural or epidural collections are noted.  SKULL BASE AND CALVARIUM:  Appears intact.  No fracture or destructive   lesion is identified.  SINUSES AND MASTOIDS:  Clear.  MISCELLANEOUS:  No orbital or suprasellar abnormality noted.    IMPRESSION:    1)  unremarkable CT study of the brain..  2)  no intracerebral hemorrhage, contusion, or acute hemorrhagic   extracerebral collections are identified.    --- End of Report ---            JOSE LOPEZ MD; Attending Radiologist  
ACC: [id number] EXAM:  CT BRAIN                          PROCEDURE DATE:  10/31/2022          INTERPRETATION:  INDICATION:  Status post trauma with head injury.     Headache.  TECHNIQUE:  A non contrast 2.5mm axial CT study of the brain was   performed from skull base to vertex. Coronal and sagittal reformations   were generated from the axial data.  COMPARISON EXAMINATION:  no prior.    FINDINGS:    HEMISPHERES:No acute intracerebral hemorrhage or contusion is identified.    No mass or space occupying lesion is noted.  No acute ischemic changes   are suggested.  VENTRICLES:  Midline and normal in size.  POSTERIOR FOSSA:  The brain stem and cerebellum are unremarkable.  No CP   angle lesion noted.  EXTRACEREBRAL SPACES:  No subdural or epidural collections are noted.  SKULL BASE AND CALVARIUM:  Appears intact.  No fracture or destructive   lesion is identified.  SINUSES AND MASTOIDS:  Clear.  MISCELLANEOUS:  No orbital or suprasellar abnormality noted.    IMPRESSION:    1)  unremarkable CT study of the brain..  2)  no intracerebral hemorrhage, contusion, or acute hemorrhagic   extracerebral collections are identified.    --- End of Report ---            JOSE LOPEZ MD; Attending Radiologist  
ACC: [id number] EXAM:  CT BRAIN                          PROCEDURE DATE:  10/31/2022          INTERPRETATION:  INDICATION:  Status post trauma with head injury.     Headache.  TECHNIQUE:  A non contrast 2.5mm axial CT study of the brain was   performed from skull base to vertex. Coronal and sagittal reformations   were generated from the axial data.  COMPARISON EXAMINATION:  no prior.    FINDINGS:    HEMISPHERES:No acute intracerebral hemorrhage or contusion is identified.    No mass or space occupying lesion is noted.  No acute ischemic changes   are suggested.  VENTRICLES:  Midline and normal in size.  POSTERIOR FOSSA:  The brain stem and cerebellum are unremarkable.  No CP   angle lesion noted.  EXTRACEREBRAL SPACES:  No subdural or epidural collections are noted.  SKULL BASE AND CALVARIUM:  Appears intact.  No fracture or destructive   lesion is identified.  SINUSES AND MASTOIDS:  Clear.  MISCELLANEOUS:  No orbital or suprasellar abnormality noted.    IMPRESSION:    1)  unremarkable CT study of the brain..  2)  no intracerebral hemorrhage, contusion, or acute hemorrhagic   extracerebral collections are identified.    --- End of Report ---            JOSE LOPEZ MD; Attending Radiologist  
ACC: [id number] EXAM:  CT BRAIN                          PROCEDURE DATE:  10/31/2022          INTERPRETATION:  INDICATION:  Status post trauma with head injury.     Headache.  TECHNIQUE:  A non contrast 2.5mm axial CT study of the brain was   performed from skull base to vertex. Coronal and sagittal reformations   were generated from the axial data.  COMPARISON EXAMINATION:  no prior.    FINDINGS:    HEMISPHERES:No acute intracerebral hemorrhage or contusion is identified.    No mass or space occupying lesion is noted.  No acute ischemic changes   are suggested.  VENTRICLES:  Midline and normal in size.  POSTERIOR FOSSA:  The brain stem and cerebellum are unremarkable.  No CP   angle lesion noted.  EXTRACEREBRAL SPACES:  No subdural or epidural collections are noted.  SKULL BASE AND CALVARIUM:  Appears intact.  No fracture or destructive   lesion is identified.  SINUSES AND MASTOIDS:  Clear.  MISCELLANEOUS:  No orbital or suprasellar abnormality noted.    IMPRESSION:    1)  unremarkable CT study of the brain..  2)  no intracerebral hemorrhage, contusion, or acute hemorrhagic   extracerebral collections are identified.    --- End of Report ---            JOSE LOPEZ MD; Attending Radiologist  
ACC: [id number] EXAM:  CT BRAIN                          PROCEDURE DATE:  10/31/2022          INTERPRETATION:  INDICATION:  Status post trauma with head injury.     Headache.  TECHNIQUE:  A non contrast 2.5mm axial CT study of the brain was   performed from skull base to vertex. Coronal and sagittal reformations   were generated from the axial data.  COMPARISON EXAMINATION:  no prior.    FINDINGS:    HEMISPHERES:No acute intracerebral hemorrhage or contusion is identified.    No mass or space occupying lesion is noted.  No acute ischemic changes   are suggested.  VENTRICLES:  Midline and normal in size.  POSTERIOR FOSSA:  The brain stem and cerebellum are unremarkable.  No CP   angle lesion noted.  EXTRACEREBRAL SPACES:  No subdural or epidural collections are noted.  SKULL BASE AND CALVARIUM:  Appears intact.  No fracture or destructive   lesion is identified.  SINUSES AND MASTOIDS:  Clear.  MISCELLANEOUS:  No orbital or suprasellar abnormality noted.    IMPRESSION:    1)  unremarkable CT study of the brain..  2)  no intracerebral hemorrhage, contusion, or acute hemorrhagic   extracerebral collections are identified.    --- End of Report ---            JOSE LOPEZ MD; Attending Radiologist  
ACC: [id number] EXAM:  CT BRAIN                          PROCEDURE DATE:  10/31/2022          INTERPRETATION:  INDICATION:  Status post trauma with head injury.     Headache.  TECHNIQUE:  A non contrast 2.5mm axial CT study of the brain was   performed from skull base to vertex. Coronal and sagittal reformations   were generated from the axial data.  COMPARISON EXAMINATION:  no prior.    FINDINGS:    HEMISPHERES:No acute intracerebral hemorrhage or contusion is identified.    No mass or space occupying lesion is noted.  No acute ischemic changes   are suggested.  VENTRICLES:  Midline and normal in size.  POSTERIOR FOSSA:  The brain stem and cerebellum are unremarkable.  No CP   angle lesion noted.  EXTRACEREBRAL SPACES:  No subdural or epidural collections are noted.  SKULL BASE AND CALVARIUM:  Appears intact.  No fracture or destructive   lesion is identified.  SINUSES AND MASTOIDS:  Clear.  MISCELLANEOUS:  No orbital or suprasellar abnormality noted.    IMPRESSION:    1)  unremarkable CT study of the brain..  2)  no intracerebral hemorrhage, contusion, or acute hemorrhagic   extracerebral collections are identified.    --- End of Report ---            JOSE LOPEZ MD; Attending Radiologist  
ACC: [id number] EXAM:  CT BRAIN                          PROCEDURE DATE:  10/31/2022          INTERPRETATION:  INDICATION:  Status post trauma with head injury.     Headache.  TECHNIQUE:  A non contrast 2.5mm axial CT study of the brain was   performed from skull base to vertex. Coronal and sagittal reformations   were generated from the axial data.  COMPARISON EXAMINATION:  no prior.    FINDINGS:    HEMISPHERES:No acute intracerebral hemorrhage or contusion is identified.    No mass or space occupying lesion is noted.  No acute ischemic changes   are suggested.  VENTRICLES:  Midline and normal in size.  POSTERIOR FOSSA:  The brain stem and cerebellum are unremarkable.  No CP   angle lesion noted.  EXTRACEREBRAL SPACES:  No subdural or epidural collections are noted.  SKULL BASE AND CALVARIUM:  Appears intact.  No fracture or destructive   lesion is identified.  SINUSES AND MASTOIDS:  Clear.  MISCELLANEOUS:  No orbital or suprasellar abnormality noted.    IMPRESSION:    1)  unremarkable CT study of the brain..  2)  no intracerebral hemorrhage, contusion, or acute hemorrhagic   extracerebral collections are identified.    --- End of Report ---            JOSE LOPEZ MD; Attending Radiologist  
ACC: [id number] EXAM:  CT BRAIN                          PROCEDURE DATE:  10/31/2022          INTERPRETATION:  INDICATION:  Status post trauma with head injury.     Headache.  TECHNIQUE:  A non contrast 2.5mm axial CT study of the brain was   performed from skull base to vertex. Coronal and sagittal reformations   were generated from the axial data.  COMPARISON EXAMINATION:  no prior.    FINDINGS:    HEMISPHERES:No acute intracerebral hemorrhage or contusion is identified.    No mass or space occupying lesion is noted.  No acute ischemic changes   are suggested.  VENTRICLES:  Midline and normal in size.  POSTERIOR FOSSA:  The brain stem and cerebellum are unremarkable.  No CP   angle lesion noted.  EXTRACEREBRAL SPACES:  No subdural or epidural collections are noted.  SKULL BASE AND CALVARIUM:  Appears intact.  No fracture or destructive   lesion is identified.  SINUSES AND MASTOIDS:  Clear.  MISCELLANEOUS:  No orbital or suprasellar abnormality noted.    IMPRESSION:    1)  unremarkable CT study of the brain..  2)  no intracerebral hemorrhage, contusion, or acute hemorrhagic   extracerebral collections are identified.    --- End of Report ---            JOSE LOPEZ MD; Attending Radiologist  
ACC: [id number] EXAM:  CT BRAIN                          PROCEDURE DATE:  10/31/2022          INTERPRETATION:  INDICATION:  Status post trauma with head injury.     Headache.  TECHNIQUE:  A non contrast 2.5mm axial CT study of the brain was   performed from skull base to vertex. Coronal and sagittal reformations   were generated from the axial data.  COMPARISON EXAMINATION:  no prior.    FINDINGS:    HEMISPHERES:No acute intracerebral hemorrhage or contusion is identified.    No mass or space occupying lesion is noted.  No acute ischemic changes   are suggested.  VENTRICLES:  Midline and normal in size.  POSTERIOR FOSSA:  The brain stem and cerebellum are unremarkable.  No CP   angle lesion noted.  EXTRACEREBRAL SPACES:  No subdural or epidural collections are noted.  SKULL BASE AND CALVARIUM:  Appears intact.  No fracture or destructive   lesion is identified.  SINUSES AND MASTOIDS:  Clear.  MISCELLANEOUS:  No orbital or suprasellar abnormality noted.    IMPRESSION:    1)  unremarkable CT study of the brain..  2)  no intracerebral hemorrhage, contusion, or acute hemorrhagic   extracerebral collections are identified.    --- End of Report ---            JOSE LOPEZ MD; Attending Radiologist  
ACC: [id number] EXAM:  CT BRAIN                          PROCEDURE DATE:  10/31/2022          INTERPRETATION:  INDICATION:  Status post trauma with head injury.     Headache.  TECHNIQUE:  A non contrast 2.5mm axial CT study of the brain was   performed from skull base to vertex. Coronal and sagittal reformations   were generated from the axial data.  COMPARISON EXAMINATION:  no prior.    FINDINGS:    HEMISPHERES:No acute intracerebral hemorrhage or contusion is identified.    No mass or space occupying lesion is noted.  No acute ischemic changes   are suggested.  VENTRICLES:  Midline and normal in size.  POSTERIOR FOSSA:  The brain stem and cerebellum are unremarkable.  No CP   angle lesion noted.  EXTRACEREBRAL SPACES:  No subdural or epidural collections are noted.  SKULL BASE AND CALVARIUM:  Appears intact.  No fracture or destructive   lesion is identified.  SINUSES AND MASTOIDS:  Clear.  MISCELLANEOUS:  No orbital or suprasellar abnormality noted.    IMPRESSION:    1)  unremarkable CT study of the brain..  2)  no intracerebral hemorrhage, contusion, or acute hemorrhagic   extracerebral collections are identified.    --- End of Report ---            JOSE LOPEZ MD; Attending Radiologist  
ACC: [id number] EXAM:  CT BRAIN                          PROCEDURE DATE:  10/31/2022          INTERPRETATION:  INDICATION:  Status post trauma with head injury.     Headache.  TECHNIQUE:  A non contrast 2.5mm axial CT study of the brain was   performed from skull base to vertex. Coronal and sagittal reformations   were generated from the axial data.  COMPARISON EXAMINATION:  no prior.    FINDINGS:    HEMISPHERES:No acute intracerebral hemorrhage or contusion is identified.    No mass or space occupying lesion is noted.  No acute ischemic changes   are suggested.  VENTRICLES:  Midline and normal in size.  POSTERIOR FOSSA:  The brain stem and cerebellum are unremarkable.  No CP   angle lesion noted.  EXTRACEREBRAL SPACES:  No subdural or epidural collections are noted.  SKULL BASE AND CALVARIUM:  Appears intact.  No fracture or destructive   lesion is identified.  SINUSES AND MASTOIDS:  Clear.  MISCELLANEOUS:  No orbital or suprasellar abnormality noted.    IMPRESSION:    1)  unremarkable CT study of the brain..  2)  no intracerebral hemorrhage, contusion, or acute hemorrhagic   extracerebral collections are identified.    --- End of Report ---            JOSE LOPEZ MD; Attending Radiologist  
ACC: [id number] EXAM:  CT BRAIN                          PROCEDURE DATE:  10/31/2022          INTERPRETATION:  INDICATION:  Status post trauma with head injury.     Headache.  TECHNIQUE:  A non contrast 2.5mm axial CT study of the brain was   performed from skull base to vertex. Coronal and sagittal reformations   were generated from the axial data.  COMPARISON EXAMINATION:  no prior.    FINDINGS:    HEMISPHERES:No acute intracerebral hemorrhage or contusion is identified.    No mass or space occupying lesion is noted.  No acute ischemic changes   are suggested.  VENTRICLES:  Midline and normal in size.  POSTERIOR FOSSA:  The brain stem and cerebellum are unremarkable.  No CP   angle lesion noted.  EXTRACEREBRAL SPACES:  No subdural or epidural collections are noted.  SKULL BASE AND CALVARIUM:  Appears intact.  No fracture or destructive   lesion is identified.  SINUSES AND MASTOIDS:  Clear.  MISCELLANEOUS:  No orbital or suprasellar abnormality noted.    IMPRESSION:    1)  unremarkable CT study of the brain..  2)  no intracerebral hemorrhage, contusion, or acute hemorrhagic   extracerebral collections are identified.    --- End of Report ---            JOSE LOPEZ MD; Attending Radiologist  
ACC: [id number] EXAM:  CT BRAIN                          PROCEDURE DATE:  10/31/2022          INTERPRETATION:  INDICATION:  Status post trauma with head injury.     Headache.  TECHNIQUE:  A non contrast 2.5mm axial CT study of the brain was   performed from skull base to vertex. Coronal and sagittal reformations   were generated from the axial data.  COMPARISON EXAMINATION:  no prior.    FINDINGS:    HEMISPHERES:No acute intracerebral hemorrhage or contusion is identified.    No mass or space occupying lesion is noted.  No acute ischemic changes   are suggested.  VENTRICLES:  Midline and normal in size.  POSTERIOR FOSSA:  The brain stem and cerebellum are unremarkable.  No CP   angle lesion noted.  EXTRACEREBRAL SPACES:  No subdural or epidural collections are noted.  SKULL BASE AND CALVARIUM:  Appears intact.  No fracture or destructive   lesion is identified.  SINUSES AND MASTOIDS:  Clear.  MISCELLANEOUS:  No orbital or suprasellar abnormality noted.    IMPRESSION:    1)  unremarkable CT study of the brain..  2)  no intracerebral hemorrhage, contusion, or acute hemorrhagic   extracerebral collections are identified.    --- End of Report ---            JOSE LOPEZ MD; Attending Radiologist  
ACC: [id number] EXAM:  CT BRAIN                          PROCEDURE DATE:  10/31/2022          INTERPRETATION:  INDICATION:  Status post trauma with head injury.     Headache.  TECHNIQUE:  A non contrast 2.5mm axial CT study of the brain was   performed from skull base to vertex. Coronal and sagittal reformations   were generated from the axial data.  COMPARISON EXAMINATION:  no prior.    FINDINGS:    HEMISPHERES:No acute intracerebral hemorrhage or contusion is identified.    No mass or space occupying lesion is noted.  No acute ischemic changes   are suggested.  VENTRICLES:  Midline and normal in size.  POSTERIOR FOSSA:  The brain stem and cerebellum are unremarkable.  No CP   angle lesion noted.  EXTRACEREBRAL SPACES:  No subdural or epidural collections are noted.  SKULL BASE AND CALVARIUM:  Appears intact.  No fracture or destructive   lesion is identified.  SINUSES AND MASTOIDS:  Clear.  MISCELLANEOUS:  No orbital or suprasellar abnormality noted.    IMPRESSION:    1)  unremarkable CT study of the brain..  2)  no intracerebral hemorrhage, contusion, or acute hemorrhagic   extracerebral collections are identified.    --- End of Report ---            JOSE LOPEZ MD; Attending Radiologist  
ACC: [id number] EXAM:  CT BRAIN                          PROCEDURE DATE:  10/31/2022          INTERPRETATION:  INDICATION:  Status post trauma with head injury.     Headache.  TECHNIQUE:  A non contrast 2.5mm axial CT study of the brain was   performed from skull base to vertex. Coronal and sagittal reformations   were generated from the axial data.  COMPARISON EXAMINATION:  no prior.    FINDINGS:    HEMISPHERES:No acute intracerebral hemorrhage or contusion is identified.    No mass or space occupying lesion is noted.  No acute ischemic changes   are suggested.  VENTRICLES:  Midline and normal in size.  POSTERIOR FOSSA:  The brain stem and cerebellum are unremarkable.  No CP   angle lesion noted.  EXTRACEREBRAL SPACES:  No subdural or epidural collections are noted.  SKULL BASE AND CALVARIUM:  Appears intact.  No fracture or destructive   lesion is identified.  SINUSES AND MASTOIDS:  Clear.  MISCELLANEOUS:  No orbital or suprasellar abnormality noted.    IMPRESSION:    1)  unremarkable CT study of the brain..  2)  no intracerebral hemorrhage, contusion, or acute hemorrhagic   extracerebral collections are identified.    --- End of Report ---            JOES LOPEZ MD; Attending Radiologist  
ACC: [id number] EXAM:  CT BRAIN                          PROCEDURE DATE:  10/31/2022          INTERPRETATION:  INDICATION:  Status post trauma with head injury.     Headache.  TECHNIQUE:  A non contrast 2.5mm axial CT study of the brain was   performed from skull base to vertex. Coronal and sagittal reformations   were generated from the axial data.  COMPARISON EXAMINATION:  no prior.    FINDINGS:    HEMISPHERES:No acute intracerebral hemorrhage or contusion is identified.    No mass or space occupying lesion is noted.  No acute ischemic changes   are suggested.  VENTRICLES:  Midline and normal in size.  POSTERIOR FOSSA:  The brain stem and cerebellum are unremarkable.  No CP   angle lesion noted.  EXTRACEREBRAL SPACES:  No subdural or epidural collections are noted.  SKULL BASE AND CALVARIUM:  Appears intact.  No fracture or destructive   lesion is identified.  SINUSES AND MASTOIDS:  Clear.  MISCELLANEOUS:  No orbital or suprasellar abnormality noted.    IMPRESSION:    1)  unremarkable CT study of the brain..  2)  no intracerebral hemorrhage, contusion, or acute hemorrhagic   extracerebral collections are identified.    --- End of Report ---            JOSE LOPEZ MD; Attending Radiologist  
ACC: [id number] EXAM:  CT BRAIN                          PROCEDURE DATE:  10/31/2022          INTERPRETATION:  INDICATION:  Status post trauma with head injury.     Headache.  TECHNIQUE:  A non contrast 2.5mm axial CT study of the brain was   performed from skull base to vertex. Coronal and sagittal reformations   were generated from the axial data.  COMPARISON EXAMINATION:  no prior.    FINDINGS:    HEMISPHERES:No acute intracerebral hemorrhage or contusion is identified.    No mass or space occupying lesion is noted.  No acute ischemic changes   are suggested.  VENTRICLES:  Midline and normal in size.  POSTERIOR FOSSA:  The brain stem and cerebellum are unremarkable.  No CP   angle lesion noted.  EXTRACEREBRAL SPACES:  No subdural or epidural collections are noted.  SKULL BASE AND CALVARIUM:  Appears intact.  No fracture or destructive   lesion is identified.  SINUSES AND MASTOIDS:  Clear.  MISCELLANEOUS:  No orbital or suprasellar abnormality noted.    IMPRESSION:    1)  unremarkable CT study of the brain..  2)  no intracerebral hemorrhage, contusion, or acute hemorrhagic   extracerebral collections are identified.    --- End of Report ---            JOSE LOPEZ MD; Attending Radiologist  
ACC: [id number] EXAM:  CT BRAIN                          PROCEDURE DATE:  10/31/2022          INTERPRETATION:  INDICATION:  Status post trauma with head injury.     Headache.  TECHNIQUE:  A non contrast 2.5mm axial CT study of the brain was   performed from skull base to vertex. Coronal and sagittal reformations   were generated from the axial data.  COMPARISON EXAMINATION:  no prior.    FINDINGS:    HEMISPHERES:No acute intracerebral hemorrhage or contusion is identified.    No mass or space occupying lesion is noted.  No acute ischemic changes   are suggested.  VENTRICLES:  Midline and normal in size.  POSTERIOR FOSSA:  The brain stem and cerebellum are unremarkable.  No CP   angle lesion noted.  EXTRACEREBRAL SPACES:  No subdural or epidural collections are noted.  SKULL BASE AND CALVARIUM:  Appears intact.  No fracture or destructive   lesion is identified.  SINUSES AND MASTOIDS:  Clear.  MISCELLANEOUS:  No orbital or suprasellar abnormality noted.    IMPRESSION:    1)  unremarkable CT study of the brain..  2)  no intracerebral hemorrhage, contusion, or acute hemorrhagic   extracerebral collections are identified.    --- End of Report ---            JOSE LOPEZ MD; Attending Radiologist  
ACC: [id number] EXAM:  CT BRAIN                          PROCEDURE DATE:  10/31/2022          INTERPRETATION:  INDICATION:  Status post trauma with head injury.     Headache.  TECHNIQUE:  A non contrast 2.5mm axial CT study of the brain was   performed from skull base to vertex. Coronal and sagittal reformations   were generated from the axial data.  COMPARISON EXAMINATION:  no prior.    FINDINGS:    HEMISPHERES:No acute intracerebral hemorrhage or contusion is identified.    No mass or space occupying lesion is noted.  No acute ischemic changes   are suggested.  VENTRICLES:  Midline and normal in size.  POSTERIOR FOSSA:  The brain stem and cerebellum are unremarkable.  No CP   angle lesion noted.  EXTRACEREBRAL SPACES:  No subdural or epidural collections are noted.  SKULL BASE AND CALVARIUM:  Appears intact.  No fracture or destructive   lesion is identified.  SINUSES AND MASTOIDS:  Clear.  MISCELLANEOUS:  No orbital or suprasellar abnormality noted.    IMPRESSION:    1)  unremarkable CT study of the brain..  2)  no intracerebral hemorrhage, contusion, or acute hemorrhagic   extracerebral collections are identified.    --- End of Report ---            JOSE LOPEZ MD; Attending Radiologist  
ACC: [id number] EXAM:  CT BRAIN                          PROCEDURE DATE:  10/31/2022          INTERPRETATION:  INDICATION:  Status post trauma with head injury.     Headache.  TECHNIQUE:  A non contrast 2.5mm axial CT study of the brain was   performed from skull base to vertex. Coronal and sagittal reformations   were generated from the axial data.  COMPARISON EXAMINATION:  no prior.    FINDINGS:    HEMISPHERES:No acute intracerebral hemorrhage or contusion is identified.    No mass or space occupying lesion is noted.  No acute ischemic changes   are suggested.  VENTRICLES:  Midline and normal in size.  POSTERIOR FOSSA:  The brain stem and cerebellum are unremarkable.  No CP   angle lesion noted.  EXTRACEREBRAL SPACES:  No subdural or epidural collections are noted.  SKULL BASE AND CALVARIUM:  Appears intact.  No fracture or destructive   lesion is identified.  SINUSES AND MASTOIDS:  Clear.  MISCELLANEOUS:  No orbital or suprasellar abnormality noted.    IMPRESSION:    1)  unremarkable CT study of the brain..  2)  no intracerebral hemorrhage, contusion, or acute hemorrhagic   extracerebral collections are identified.    --- End of Report ---            JOSE LOPEZ MD; Attending Radiologist

## 2022-11-30 NOTE — BH INPATIENT PSYCHIATRY PROGRESS NOTE - NSBHMSETHTASSOC_PSY_A_CORE
Unable to assess
Loose
Normal
Loose

## 2022-11-30 NOTE — BH INPATIENT PSYCHIATRY PROGRESS NOTE - NSTXPSYCHOPROGRES_PSY_ALL_CORE
Improving
No Change
Improving
Improving

## 2022-11-30 NOTE — BH INPATIENT PSYCHIATRY PROGRESS NOTE - NSBHATTESTATTENDNAMEFT_PSY_A_CORE
Donn Ribera
Tony George
Donn Ribera

## 2022-11-30 NOTE — BH INPATIENT PSYCHIATRY PROGRESS NOTE - NSBHFUPINTERVALHXFT_PSY_A_CORE
Patient visible on the unit, declining to speak to writer. MAR review indicates that pt has been compliant with medications. Is due for invega sustenna 156mg today. Discharge planned for 12/2/22    VPA 11/19/22: 97.1

## 2022-11-30 NOTE — BH INPATIENT PSYCHIATRY PROGRESS NOTE - NSTXDCOPLKDATEEST_PSY_ALL_CORE
08-Nov-2022
15-Nov-2022
08-Nov-2022
08-Nov-2022
15-Nov-2022
08-Nov-2022
15-Nov-2022
01-Nov-2022
15-Nov-2022
08-Nov-2022
01-Nov-2022
15-Nov-2022
29-Nov-2022

## 2022-11-30 NOTE — BH INPATIENT PSYCHIATRY PROGRESS NOTE - NSBHMSETHTCONTENT_PSY_A_CORE
Preoccupations
Ruminations
Ruminations
Preoccupations
Ruminations
Preoccupations
Unable to assess
Ruminations
Ruminations
Preoccupations
Preoccupations
Ruminations
Preoccupations

## 2022-11-30 NOTE — BH INPATIENT PSYCHIATRY PROGRESS NOTE - NSTXDCOPLKINTERMD_PSY_ALL_CORE
Psychopharmacology x15 minutes, will titrate meds as appropriate
Psychopharmacology x15 minutes, will titrate meds as appropriate
Psychopharmacology x15 minutes, will titrate meds ( depakote 500mg and risperdal) as appropriate
Psychopharmacology x15 minutes, will titrate meds ( depakote 500mg and risperdal) as appropriate
Psychopharmacology x15 minutes, will titrate meds as appropriate
Psychopharmacology x15 minutes, will titrate meds as appropriate
Psychopharmacology x15 minutes, will titrate meds ( depakote 500mg/1000mg and risperdal 3mg bid) as appropriate, Invega Sustenna to be administered
Psychopharmacology x15 minutes, will titrate meds ( depakote 500mg and risperdal) as appropriate
Psychopharmacology x15 minutes, will titrate meds as appropriate
Psychopharmacology x15 minutes, will titrate meds ( depakote 500mg and risperdal) as appropriate, Invega Sustenna to be administered
Psychopharmacology x15 minutes, will titrate meds as appropriate
Psychopharmacology x15 minutes, will titrate meds ( depakote 500mg and risperdal) as appropriate, Invega Sustenna to be administered
Psychopharmacology x15 minutes, will titrate meds ( depakote 500mg/1000mg and risperdal 3mg bid) as appropriate, Invega Sustenna to be administered
Psychopharmacology x15 minutes, will titrate meds ( depakote 500mg and risperdal) as appropriate, Invega Sustenna to be administered
Psychopharmacology x15 minutes, will titrate meds ( depakote 500mg and risperdal) as appropriate
Psychopharmacology x15 minutes, will titrate meds ( depakote 500mg and risperdal) as appropriate, Invega Sustenna to be administered
Psychopharmacology x15 minutes, will titrate meds ( depakote 500mg and risperdal) as appropriate
Psychopharmacology x15 minutes, will titrate meds ( depakote 500mg/1000mg and risperdal 3mg bid) as appropriate, Invega Sustenna to be administered

## 2022-11-30 NOTE — BH INPATIENT PSYCHIATRY PROGRESS NOTE - NSTXOTHERDATETRGT_PSY_ALL_CORE
14-Nov-2022
09-Nov-2022
14-Nov-2022
09-Nov-2022
14-Nov-2022
09-Nov-2022

## 2022-11-30 NOTE — BH INPATIENT PSYCHIATRY PROGRESS NOTE - NSBHMSEAFFCONG_PSY_A_CORE
Congruent
Unable to assess
Congruent

## 2022-11-30 NOTE — BH INPATIENT PSYCHIATRY PROGRESS NOTE - NSBHMSERECMEM_PSY_A_CORE
Normal
Normal
Unable to assess
Unable to assess
Normal
Unable to assess
Normal
Unable to assess
Normal
Unable to assess
Unable to assess
Normal
Unable to assess

## 2022-11-30 NOTE — BH INPATIENT PSYCHIATRY PROGRESS NOTE - NSTXOTHERDATEEST_PSY_ALL_CORE
02-Nov-2022

## 2022-11-30 NOTE — BH INPATIENT PSYCHIATRY PROGRESS NOTE - NSBHMSEINSIGHT_PSY_A_CORE
Poor
Fair
Poor
Unable to assess
Poor
Poor

## 2022-11-30 NOTE — BH INPATIENT PSYCHIATRY PROGRESS NOTE - NSBHMSEJUDGE_PSY_A_CORE
Poor
Unable to assess
Poor
Fair
Poor

## 2022-11-30 NOTE — BH INPATIENT PSYCHIATRY PROGRESS NOTE - NSCGIIMPROVESX_PSY_ALL_CORE
2 = Much improved - notably better with signficant reduction of symptoms; increase in the level of functioning but some symptoms remain

## 2022-11-30 NOTE — BH INPATIENT PSYCHIATRY PROGRESS NOTE - NSTXPSYCHODATETRGT_PSY_ALL_CORE
09-Nov-2022
15-Nov-2022
23-Nov-2022
29-Nov-2022
06-Dec-2022
29-Nov-2022
15-Nov-2022
23-Nov-2022
15-Nov-2022
06-Dec-2022
14-Nov-2022
15-Nov-2022
15-Nov-2022
09-Nov-2022
23-Nov-2022
22-Nov-2022
29-Nov-2022
29-Nov-2022
09-Nov-2022
23-Nov-2022

## 2022-11-30 NOTE — BH INPATIENT PSYCHIATRY PROGRESS NOTE - NSTXDCOPLKGOAL_PSY_ALL_CORE
Will agree to consider an appropriate level of outpatient care
Other...
Other...
Will agree to consider an appropriate level of outpatient care
Other...
Will agree to consider an appropriate level of outpatient care
Other...
Will agree to consider an appropriate level of outpatient care

## 2022-11-30 NOTE — BH INPATIENT PSYCHIATRY PROGRESS NOTE - NSDCCRITERIA_PSY_ALL_CORE
Improvement in mood and psychotic symptoms

## 2022-11-30 NOTE — BH INPATIENT PSYCHIATRY PROGRESS NOTE - NSBHMSEAFFQUAL_PSY_A_CORE
Irritable
Irritable
Anxious
Irritable
Anxious
Anxious
Irritable
Irritable
Anxious
Irritable
Anxious
Anxious
Irritable
Unable to assess
Irritable
Anxious
Anxious
Euthymic

## 2022-11-30 NOTE — BH INPATIENT PSYCHIATRY PROGRESS NOTE - NSBHATTESTBILLONSITE_PSY_A_CORE
PANCHITO to bill

## 2022-11-30 NOTE — BH INPATIENT PSYCHIATRY PROGRESS NOTE - NSTXPROBDCOPLK_PSY_ALL_CORE
DISCHARGE ISSUE - LACK OF APPROPRIATE OUTPATIENT SERVICES

## 2022-11-30 NOTE — BH INPATIENT PSYCHIATRY PROGRESS NOTE - NSTXPSYCHOINTERMD_PSY_ALL_CORE
Psychopharmacology x15 minutes, will titrate meds as appropriate
Psychopharmacology x15 minutes, will titrate meds ( depakote 500mg and risperdal 2 mg, 3 mg) as appropriate
Psychopharmacology x15 minutes, will titrate meds ( depakote 500mg and risperdal 2 mg, 3 mg) as appropriate, Invega Roberto, discussing ways to improve her hygiene. 
Psychopharmacology x15 minutes, will titrate meds ( depakote 500mg and risperdal 2 mg, 3 mg) as appropriate, Invega Roberto, discussing ways to improve her hygiene. 
Psychopharmacology x15 minutes, will titrate meds ( depakote 500mg and risperdal 2 mg, 3 mg) as appropriate
Psychopharmacology x15 minutes, will titrate meds as appropriate
Psychopharmacology x15 minutes, will titrate meds ( depakote 500mg and risperdal 2 mg, 3 mg) as appropriate
Psychopharmacology x15 minutes, will titrate meds ( depakote 500mg/1000mg and risperdal 3mg bid) as appropriate, Invega Sustenna to be administered
Psychopharmacology x15 minutes, will titrate meds ( depakote 500mg/1000mg and risperdal 3mg bid) as appropriate, Invega Sustenna to be administered
Psychopharmacology x15 minutes, will titrate meds ( depakote 500mg and risperdal 2 mg, 3 mg) as appropriate
Psychopharmacology x15 minutes, will titrate meds ( depakote 500mg/1000mg and risperdal 3mg bid) as appropriate, Invega Sustenna to be administered
Psychopharmacology x15 minutes, will titrate meds as appropriate
Psychopharmacology x15 minutes, will titrate meds as appropriate
Psychopharmacology x15 minutes, will titrate meds ( depakote 500mg and risperdal 2 mg, 3 mg) as appropriate, Invega Roberto, discussing ways to improve her hygiene. 
Psychopharmacology x15 minutes, will titrate meds ( depakote 500mg/1000mg and risperdal 3mg bid) as appropriate, Invega Sustenna to be administered
Psychopharmacology x15 minutes, will titrate meds ( depakote 500mg and risperdal 2 mg, 3 mg) as appropriate, Invega Roberto, discussing ways to improve her hygiene. 
Psychopharmacology x15 minutes, will titrate meds ( depakote 500mg and risperdal 2 mg, 3 mg) as appropriate
Psychopharmacology x15 minutes, will titrate meds ( depakote 500mg/1000mg and risperdal 3mg bid) as appropriate, Invega Sustenna to be administered

## 2022-11-30 NOTE — BH INPATIENT PSYCHIATRY PROGRESS NOTE - NSBHMSEIMPULSE_PSY_A_CORE
Impaired
Impaired
Normal
Impaired
Impaired
Normal
Impaired
Normal
Impaired
Impaired
Normal
Impaired
Normal
Impaired
Normal
Normal
Impaired

## 2022-11-30 NOTE — BH INPATIENT PSYCHIATRY PROGRESS NOTE - NSBHMSEPERCEPT_PSY_A_CORE
No abnormalities
No abnormalities/Unable to assess
No abnormalities
No abnormalities
Unable to assess
No abnormalities
No abnormalities/Unable to assess
Unable to assess
No abnormalities
Unable to assess

## 2022-11-30 NOTE — BH INPATIENT PSYCHIATRY PROGRESS NOTE - NSBHMSEKNOW_PSY_A_CORE
Normal
Unable to assess
Normal
Unable to assess
Normal
Normal
Unable to assess

## 2022-11-30 NOTE — BH INPATIENT PSYCHIATRY PROGRESS NOTE - NSBHMSEMOOD_PSY_A_CORE
Unable to assess
Normal/Anxious
Angry
Normal/Anxious
Irritable/Angry
Irritable/Angry
Angry
Normal/Anxious
Anxious
Irritable/Angry
Normal/Anxious
Normal/Anxious
Angry
Irritable/Angry
Irritable/Angry
Normal
Anxious
Anxious

## 2022-11-30 NOTE — BH INPATIENT PSYCHIATRY PROGRESS NOTE - CURRENT MEDICATION
MEDICATIONS  (STANDING):  divalproex  milliGRAM(s) Oral daily  divalproex DR 1000 milliGRAM(s) Oral at bedtime  paliperidone Injectable, Long Acting 156 milliGRAM(s) IntraMuscular once  risperiDONE   Tablet 3 milliGRAM(s) Oral two times a day    MEDICATIONS  (PRN):  diphenhydrAMINE 50 milliGRAM(s) Oral every 6 hours PRN Rash and/or Itching  haloperidol     Tablet 5 milliGRAM(s) Oral every 6 hours PRN agitation  hydrOXYzine hydrochloride 50 milliGRAM(s) Oral every 6 hours PRN Anxiety

## 2022-11-30 NOTE — BH INPATIENT PSYCHIATRY PROGRESS NOTE - NSBHMSEREMMEM_PSY_A_CORE
Unable to assess
Unable to assess
Normal
Normal
Unable to assess
Normal
Unable to assess
Unable to assess
Normal
Unable to assess
Normal
Unable to assess
Normal
Unable to assess

## 2022-11-30 NOTE — BH INPATIENT PSYCHIATRY PROGRESS NOTE - NSBHATTESTBILLINGAW_PSY_A_CORE
[id number]-Pxozwgcgmf Inpatient care - low complexity - 15 minutes
49111-Hbatoxscyg Inpatient care - moderate complexity - 25 minutes
61894-Hqldrcakut Inpatient care - low complexity - 15 minutes
38751-Jlrjyzuumx Inpatient care - low complexity - 15 minutes
70946-Uijigwvcva Inpatient care - high complexity - 35 minutes
80914-Blgviljsnz Inpatient care - moderate complexity - 25 minutes
53951-Iiejnpzujh Inpatient care - low complexity - 15 minutes
77876-Ujksjmgoli Inpatient care - low complexity - 15 minutes
90889-Jfluginiho Inpatient care - low complexity - 15 minutes
72708-Zpcnzpmayh Inpatient care - moderate complexity - 25 minutes
38602-Vcbgkmwzyn Inpatient care - low complexity - 15 minutes
22939-Wgsianlleu Inpatient care - low complexity - 15 minutes
41533-Unujscpnah Inpatient care - moderate complexity - 25 minutes
78797-Idivzluqba Inpatient care - moderate complexity - 25 minutes
18542-Cxzktiauuk Inpatient care - low complexity - 15 minutes
29885-Udlvpnvehy Inpatient care - high complexity - 35 minutes
03970-Tbjdeyqdfu Inpatient care - high complexity - 35 minutes
62178-Rntsrbtjws Inpatient care - moderate complexity - 25 minutes
38144-Pysfzwduza Inpatient care - low complexity - 15 minutes
74222-Ibuujkmpmr Inpatient care - moderate complexity - 25 minutes

## 2022-11-30 NOTE — BH INPATIENT PSYCHIATRY PROGRESS NOTE - NSBHMSETHTPROC_PSY_A_CORE
Disorganized/Perseverative/Illogical
Disorganized/Perseverative/Illogical
Tangential
Overinclusive/Impaired reasoning
Disorganized/Perseverative/Illogical
Disorganized/Perseverative/Illogical
Overinclusive/Impaired reasoning
Disorganized/Perseverative/Illogical
Overinclusive/Impaired reasoning
Tangential
Disorganized/Perseverative/Illogical
Overinclusive
Tangential
Disorganized/Perseverative/Illogical
Unable to assess
Tangential
Overinclusive/Impaired reasoning
Overinclusive/Impaired reasoning
Tangential
Disorganized/Perseverative/Illogical

## 2022-11-30 NOTE — BH INPATIENT PSYCHIATRY PROGRESS NOTE - NSTXOTHERGOAL_PSY_ALL_CORE
Wound care

## 2022-11-30 NOTE — BH INPATIENT PSYCHIATRY PROGRESS NOTE - NSBHATTESTTYPEVISIT_PSY_A_CORE
On-site Attending supervising PANCHITO (99XXX codes)
Resident/Fellow with telephonic supervision
On-site Attending supervising PANCHITO (99XXX codes)
Attending Only
On-site Attending supervising PANCHITO (99XXX codes)
Attending Only
On-site Attending supervising PANCHITO (99XXX codes)

## 2022-11-30 NOTE — BH INPATIENT PSYCHIATRY PROGRESS NOTE - NSBHASSESSSUMMFT_PSY_ALL_CORE
This is a 52 yo  female unemployed, unmarried, reports to be currently domiciled with roommate. Medical hx significant wound on forehead and chronic anemia. Psychiatric hx pertinent for Schizoaffective Disorder, prior hospitalizations (last was 1 month ago per roommate). No known suicide attempts, no legal hx, no drug use. Patient presents to ED via EMS for a wound check and was noted to be making bizarre statements and in poor ability to care for self. Received prns of haldol and ativan while in ED. Patient transferred to [facility] and admitted 2pc.     On evaluation, the patient is irritable, agitated, uncooperative, not verbally redirectable and demonstrating poor behavioral control and disorganized thought processes.  On approach, she is yelling at a staff member calling him a "bum" because she was encouraged to take a shower.  The patient is unable to de-escalate and haldol 5 mg IM, lorazepam 2 mg IM and diphenhydramine 50 mg IM ONCE is ordered for agitation.      Plan:   - Risperdal 3mg bid. Invega Sustenna 234mg given on 11/25, is due for 156mg on 11/30  -  Depakote 500 mg qd, 1000mg qhs (vpa  11/8/22: 64.9, 11/19/22: 97.1 )  - haldol 5 mg PO/IM, lorazepam 2 mg PO/IM, diphenhydramine 50 mg PO/IM q6h PRN for agitation; hold for qtc >500 ms.    11/7 received IM  prns on 11/6 for agitation, risperdal to be increased to 2mg bid, depakote/cmp level pending for tomorrow am, remains uncooperative, irritable  and dismissive  11/8 Compliant with med regimen, irritable, dismissive, psychotic  11/9 Retained by court for further treatment, risperdal increased to 2mg qd/3mg qhs for ongoing disorganization and flight of ideas  11/10 Highly irritable, unable to be engaged, taking medications.   11/11 Remains irritable, but is taking medications  11/14 Better related, improved mood, grossly organized, risperdal increased to 3mg bid  11/15 VPA increased to total of 1500mg qd due to labile mood  11/16 labile mood, has more moments of organized thoughts  11/17 remains unchanged  ;;11/28: while irritable recently today seems improved on current regime with lowered irritability  Current medications diphenhydrAMINE 50 milliGRAM(s) Oral every 6 hours PRN  divalproex  milliGRAM(s) Oral daily for mood  divalproex DR 1000 milliGRAM(s) Oral at bedtime for mood  haloperidol     Tablet 5 milliGRAM(s) Oral every 6 hours PRN  hydrOXYzine hydrochloride 50 milliGRAM(s) Oral every 6 hours PRN  risperiDONE   Tablet 3 milliGRAM(s) Oral two times a day for psychosis

## 2022-11-30 NOTE — BH INPATIENT PSYCHIATRY PROGRESS NOTE - NSTXOTHERINTERMD_PSY_ALL_CORE
Will coordinate with wound care

## 2022-11-30 NOTE — BH INPATIENT PSYCHIATRY PROGRESS NOTE - NSTXPSYCHODATEEST_PSY_ALL_CORE
01-Nov-2022
08-Nov-2022
22-Nov-2022
22-Nov-2022
01-Nov-2022
08-Nov-2022
22-Nov-2022
01-Nov-2022
15-Nov-2022
01-Nov-2022
15-Nov-2022
29-Nov-2022
15-Nov-2022
22-Nov-2022
15-Nov-2022
29-Nov-2022
08-Nov-2022
15-Nov-2022

## 2022-11-30 NOTE — BH INPATIENT PSYCHIATRY PROGRESS NOTE - NSBHCHARTREVIEWVS_PSY_A_CORE FT
Vital Signs Last 24 Hrs  T(C): 36.4 (11-30-22 @ 09:13), Max: 36.4 (11-30-22 @ 09:13)  T(F): 97.6 (11-30-22 @ 09:13), Max: 97.6 (11-30-22 @ 09:13)  HR: 89 (11-30-22 @ 09:13) (89 - 89)  BP: 124/79 (11-30-22 @ 09:13) (124/79 - 124/79)  BP(mean): --  RR: 18 (11-30-22 @ 09:13) (18 - 18)  SpO2: 98% (11-30-22 @ 09:13) (98% - 98%)

## 2022-11-30 NOTE — BH INPATIENT PSYCHIATRY PROGRESS NOTE - NSTXDCOPLKPROGRES_PSY_ALL_CORE
No Change
No Change
Improving
No Change
Improving
No Change
No Change
Improving
No Change
Improving
Improving

## 2022-11-30 NOTE — BH INPATIENT PSYCHIATRY PROGRESS NOTE - NSBHMETABOLIC_PSY_ALL_CORE_FT
BMI: BMI (kg/m2): 23.1 (11-01-22 @ 01:23)  HbA1c:   Glucose:   BP: 124/79 (11-30-22 @ 09:13) (109/64 - 124/79)  Lipid Panel:

## 2022-11-30 NOTE — BH INPATIENT PSYCHIATRY PROGRESS NOTE - NSTXPSYCHOGOAL_PSY_ALL_CORE
Will verbalize 1 strategy to successfully cope with psychotic symptoms
Will identify 2 coping skills that help mitigate hallucinations
Will identify 2 coping skills that help mitigate hallucinations
Will verbalize 1 strategy to successfully cope with psychotic symptoms
Will identify 2 coping skills that help mitigate hallucinations
Will verbalize 1 strategy to successfully cope with psychotic symptoms
Will identify 2 coping skills that help mitigate hallucinations
Will identify 2 coping skills that help mitigate hallucinations
Will verbalize 1 strategy to successfully cope with psychotic symptoms
Will identify 2 coping skills that help mitigate hallucinations
Will verbalize 1 strategy to successfully cope with psychotic symptoms
Will identify 2 coping skills that help mitigate hallucinations
Will identify 2 coping skills that help mitigate hallucinations
Will verbalize 1 strategy to successfully cope with psychotic symptoms
Will identify 2 coping skills that help mitigate hallucinations
Will verbalize 1 strategy to successfully cope with psychotic symptoms
Will verbalize 1 strategy to successfully cope with psychotic symptoms
Will identify 2 coping skills that help mitigate hallucinations

## 2022-11-30 NOTE — BH INPATIENT PSYCHIATRY PROGRESS NOTE - NSTXPROBOTHER_PSY_ALL_CORE
OTHER PROBLEM

## 2022-12-01 RX ORDER — DIVALPROEX SODIUM 500 MG/1
3 TABLET, DELAYED RELEASE ORAL
Qty: 42 | Refills: 0
Start: 2022-12-01 | End: 2022-12-14

## 2022-12-01 RX ORDER — DIVALPROEX SODIUM 500 MG/1
2 TABLET, DELAYED RELEASE ORAL
Qty: 0 | Refills: 0 | DISCHARGE
Start: 2022-12-01

## 2022-12-01 RX ORDER — DIVALPROEX SODIUM 500 MG/1
1 TABLET, DELAYED RELEASE ORAL
Qty: 0 | Refills: 0 | DISCHARGE
Start: 2022-12-01

## 2022-12-01 RX ADMIN — RISPERIDONE 3 MILLIGRAM(S): 4 TABLET ORAL at 09:59

## 2022-12-01 RX ADMIN — DIVALPROEX SODIUM 500 MILLIGRAM(S): 500 TABLET, DELAYED RELEASE ORAL at 09:58

## 2022-12-01 RX ADMIN — DIVALPROEX SODIUM 1000 MILLIGRAM(S): 500 TABLET, DELAYED RELEASE ORAL at 21:14

## 2022-12-01 RX ADMIN — RISPERIDONE 3 MILLIGRAM(S): 4 TABLET ORAL at 21:14

## 2022-12-01 NOTE — BH DISCHARGE NOTE NURSING/SOCIAL WORK/PSYCH REHAB - NSDCPRGOAL_PSY_ALL_CORE
Pt attended select groups intermittently over this hospitalization. Pt appeared elevated, paranoid and disorganized earlier in the admission but improved in organization and emotional regulation. Pt appears better related and was cooperative in safety planning process. Pt endorses some concern re: ongoing psychosocial stressors but is able to identify multiple coping strategies, support professionals, and demonstrates future oriented thinking. Pt completed her safety plan and received a copy to take with her.

## 2022-12-01 NOTE — BH DISCHARGE NOTE NURSING/SOCIAL WORK/PSYCH REHAB - NSDCADDINFO1FT_PSY_ALL_CORE
Appointment on Monday, 12/05/2022 at 11:00AM. This is an in-person appointment. Please bring a copy of your insurance card and photo ID.

## 2022-12-01 NOTE — BH DISCHARGE NOTE NURSING/SOCIAL WORK/PSYCH REHAB - NSCDUDCCRISIS_PSY_A_CORE
[facility] Well  1 [phone] [facility]-WELL ([phone])  Text "WELL" to [phone]  Website: www.[employer]/.[employer] 1 (472) 621-HOPE (1570) Website: www.safehorizon.org/.National Suicide Prevention Lifeline 6 (879) 744-2645/.  [employer]  1 (756) LIFENET (719-8905)/988 Suicide and Crisis Lifeline

## 2022-12-02 PROCEDURE — 99232 SBSQ HOSP IP/OBS MODERATE 35: CPT

## 2022-12-02 RX ADMIN — RISPERIDONE 3 MILLIGRAM(S): 4 TABLET ORAL at 10:12

## 2022-12-02 RX ADMIN — DIVALPROEX SODIUM 500 MILLIGRAM(S): 500 TABLET, DELAYED RELEASE ORAL at 10:12

## 2022-12-02 NOTE — BH INPATIENT PSYCHIATRY DISCHARGE NOTE - NSDCCPCAREPLAN_GEN_ALL_CORE_FT
PRINCIPAL DISCHARGE DIAGNOSIS  Diagnosis: Schizoaffective disorder  Assessment and Plan of Treatment: Continue current medication regimen and follow up with aftercare appointments

## 2022-12-02 NOTE — BH INPATIENT PSYCHIATRY DISCHARGE NOTE - OTHER PAST PSYCHIATRIC HISTORY (INCLUDE DETAILS REGARDING ONSET, COURSE OF ILLNESS, INPATIENT/OUTPATIENT TREATMENT)
Previous Dx: SAFD   Previous Med Trials: Clozapine, Prolixin depakote   Previous IP treatment: DC from [facility] on 10/18/06; [facility] for Schizoaffective Disorder on 8/18/22   Previous SA: denies   Cutting: denies   Current  treatment: Dr. Kilgore? And therapist unable to determine; per roommate none   Violence/Legal: denies

## 2022-12-02 NOTE — BH INPATIENT PSYCHIATRY DISCHARGE NOTE - VIOLENCE RISK FACTORS:
Affective dysregulation/Noncompliance with treatment/Community stressors that increase the risk of destabilization/Irritability Mucosal Advancement Flap Text: Given the location of the defect, shape of the defect and the proximity to free margins a mucosal advancement flap was deemed most appropriate. Incisions were made with a 15 blade scalpel in the appropriate fashion along the cutaneous vermillion border and the mucosal lip. The remaining actinically damaged mucosal tissue was excised.  The mucosal advancement flap was then elevated to the gingival sulcus with care taken to preserve the neurovascular structures and advanced into the primary defect. Care was taken to ensure that precise realignment of the vermilion border was achieved.

## 2022-12-02 NOTE — BH INPATIENT PSYCHIATRY DISCHARGE NOTE - REASON FOR ADMISSION
Patient presents to ED via EMS for a wound check and was noted to be making bizarre statements and in poor ability to care for self.

## 2022-12-02 NOTE — BH INPATIENT PSYCHIATRY DISCHARGE NOTE - NSICDXPASTMEDICALHX_GEN_ALL_CORE_FT
PAST MEDICAL HISTORY:  Chronic anemia      PAST MEDICAL HISTORY:  Chronic anemia     Urinary, incontinence, stress female

## 2022-12-02 NOTE — BH INPATIENT PSYCHIATRY DISCHARGE NOTE - NSDCPROCEDURESFT_PSY_ALL_CORE
ACC: [id number] EXAM:  CT BRAIN                          PROCEDURE DATE:  10/31/2022          INTERPRETATION:  INDICATION:  Status post trauma with head injury.     Headache.  TECHNIQUE:  A non contrast 2.5mm axial CT study of the brain was   performed from skull base to vertex. Coronal and sagittal reformations   were generated from the axial data.  COMPARISON EXAMINATION:  no prior.    FINDINGS:    HEMISPHERES:No acute intracerebral hemorrhage or contusion is identified.    No mass or space occupying lesion is noted.  No acute ischemic changes   are suggested.  VENTRICLES:  Midline and normal in size.  POSTERIOR FOSSA:  The brain stem and cerebellum are unremarkable.  No CP   angle lesion noted.  EXTRACEREBRAL SPACES:  No subdural or epidural collections are noted.  SKULL BASE AND CALVARIUM:  Appears intact.  No fracture or destructive   lesion is identified.  SINUSES AND MASTOIDS:  Clear.  MISCELLANEOUS:  No orbital or suprasellar abnormality noted.    IMPRESSION:    1)  unremarkable CT study of the brain..  2)  no intracerebral hemorrhage, contusion, or acute hemorrhagic   extracerebral collections are identified.    --- End of Report ---

## 2022-12-02 NOTE — BH INPATIENT PSYCHIATRY DISCHARGE NOTE - HPI (INCLUDE ILLNESS QUALITY, SEVERITY, DURATION, TIMING, CONTEXT, MODIFYING FACTORS, ASSOCIATED SIGNS AND SYMPTOMS)
This is a 50 yo  female unemployed, unmarried, reports to be currently domiciled with roommate. Medical hx significant wound on forehead and chronic anemia. Psychiatric hx pertinent for Schizoaffective Disorder, prior hospitalizations (last was 1 month ago per roommate). No known suicide attempts, no legal hx, no drug use. Patient presents to ED via EMS for a wound check and was noted to be making bizarre statements and in poor ability to care for self. Received prns of haldol and ativan while in ED. Patient transferred to [facility] and admitted 2pc.       Patient noted to be malodorous and irritable on approach, quite disorganized and unable to discuss what brought her into the hospital. She does state that she has schizoaffective disorder and takes medications, but doesn't know which ones. Denies si/hi/avh or PI. Speaks about getting a spot on her forehead due to being scorched by the sun, but refuses to allow writer to see her wound. Noted to be aggressive at times towards treatment team stating that she recognized certain staff members from another hospitalization and didn't trust them. Refused to answer further questions stating that she could only give 2 minutes of her time. Later in afternoon pt did receive po prns of haldol 5mg/ativan 2mg/benadryl 50mg due to paranoia and verbal aggression.     Per Roommate, Bart: [phone]   -She got lost for about 2 weeks, family didn’t know where she was at; patient calling from phone kiosk and refused to tell where she was; finally told roommate whom picked her in [place] after calling (4 days ago); scarf on head with big gash and patient wouldn’t tell her how she got the gash   -Per roommmate patient has been more paranoid and acting strange; hyperverbal and not making sense; mentioning people that he isn’t aware of; he is concerned for AH; denies drugs or regular alcohol use of patient; not taking medication and stops taking medication as soon as she is discharged from the hospital.    -20x hospitalization      Per ED Note: “Reports police have been in her house and that has been making her angry. Pt w/ rapid speech and muttering regarding police, unclear exactly what she is saying. States the sun burned a hole in her forehead this summer. Reports children in the neighborhood pulled out 19 cue tips from her ears yesterday. Also reports being allergic to strawberries when she tries to pick them in the summer. Denies taking medications. States she used to see a Dr. Norris out of [facility] but no longer.”

## 2022-12-02 NOTE — BH INPATIENT PSYCHIATRY DISCHARGE NOTE - NSDCMRMEDTOKEN_GEN_ALL_CORE_FT
divalproex sodium 500 mg oral delayed release tablet: 1 tab(s) orally once a day  divalproex sodium 500 mg oral delayed release tablet: 1 tab(s) orally once a day in morning  2 tab(s) orally once a day (at bedtime)

## 2022-12-06 DIAGNOSIS — D64.9 ANEMIA, UNSPECIFIED: ICD-10-CM
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
## 2022-12-06 DIAGNOSIS — Z91.018 ALLERGY TO OTHER FOODS: ICD-10-CM

## 2022-12-06 DIAGNOSIS — R45.1 RESTLESSNESS AND AGITATION: ICD-10-CM

## 2022-12-06 DIAGNOSIS — S01.80XA UNSPECIFIED OPEN WOUND OF OTHER PART OF HEAD, INITIAL ENCOUNTER: ICD-10-CM

## 2022-12-06 DIAGNOSIS — F22 DELUSIONAL DISORDERS: ICD-10-CM

## 2022-12-06 DIAGNOSIS — Z91.14 PATIENT'S OTHER NONCOMPLIANCE WITH MEDICATION REGIMEN: ICD-10-CM

## 2022-12-06 DIAGNOSIS — Z91.199 PATIENT'S NONCOMPLIANCE WITH OTHER MEDICAL TREATMENT AND REGIMEN DUE TO UNSPECIFIED REASON: ICD-10-CM

## 2022-12-06 DIAGNOSIS — Z28.310 UNVACCINATED FOR COVID-19: ICD-10-CM

## 2022-12-06 DIAGNOSIS — F25.0 SCHIZOAFFECTIVE DISORDER, BIPOLAR TYPE: ICD-10-CM

## 2022-12-06 DIAGNOSIS — F25.9 SCHIZOAFFECTIVE DISORDER, UNSPECIFIED: ICD-10-CM

## 2022-12-15 NOTE — BH SOCIAL WORK CONFIRMATION FOLLOW UP NOTE - NSEXCLUSIONS_PSY_ALL_CORE
July 28, 2021      Caity Caicedodale  [address]   [address]   [facility] [address]           Medication Plan          Accurate as of July 23, 2021 11:59 PM. Always use your most recent med list.            Take these medications at their scheduled times    mycophenolate 250 MG capsule  Dose: 500 mg  Take 2 capsules (500 mg) by mouth 2 times daily  This medication is very important: It prevents organ rejection.  Commonly known as: GENERIC EQUIVALENT   Morning   Noon   Evening   Bedtime     tacrolimus 0.5 MG capsule  Dose: 1.5 mg  Take 3 capsules (1.5 mg) by mouth 2 times daily  This medication is very important: It prevents organ rejection.  Commonly known as: GENERIC EQUIVALENT   Morning   Noon   Evening   Bedtime     magnesium oxide 400 MG tablet  Dose: 400 mg  Take 1 tablet (400 mg) by mouth daily  Commonly known as: MAG-OX   Morning   Noon   Evening   Bedtime     vitamin D3 50 mcg (2000 units) tablet  Dose: 50 mcg  Take 1 tablet (50 mcg) by mouth daily  Commonly known as: CHOLECALCIFEROL   Morning   Noon   Evening   Bedtime        Take these medications as directed    B-12 1000 MCG Tbcr  TAKE ONE TABLET BY MOUTH EVERY DAY   Morning   Noon   Evening   Bedtime     B-D ASSURE BPM/AUTO ARM CUFF Misc  Monitor blood pressure and pulse twice daily   Morning   Noon   Evening   Bedtime     ENTYVIO IV   Morning   Noon   Evening   Bedtime     losartan 25 MG tablet  TAKE 1/2 TABLET BY MOUTH AT BEDTIME  Commonly known as: COZAAR   Morning   Noon   Evening   Bedtime     UNABLE TO FIND  MEDICATION NAME: Biotin   Morning   Noon   Evening   Bedtime           No Exclusions Apply

## 2022-12-15 NOTE — BH SOCIAL WORK CONFIRMATION FOLLOW UP NOTE - NSCOMMENTS_PSY_ALL_CORE
SW reached out to [facility] to confirm patient attendance to outpatient appointment. Clinic unable to locate patient in their system.

## 2022-12-22 PROCEDURE — 36415 COLL VENOUS BLD VENIPUNCTURE: CPT

## 2022-12-22 PROCEDURE — 80164 ASSAY DIPROPYLACETIC ACD TOT: CPT

## 2022-12-22 PROCEDURE — 80053 COMPREHEN METABOLIC PANEL: CPT

## 2022-12-22 PROCEDURE — 81001 URINALYSIS AUTO W/SCOPE: CPT

## 2023-05-30 NOTE — BH INPATIENT PSYCHIATRY PROGRESS NOTE - NSBHMETABOLIC_PSY_ALL_CORE_FT
Physical Therapy Visit    Visit Type: Progress Note -  Daily Treatment Note  Visit: 3  Referring Provider: Amanda Negron DO  Medical Diagnosis (from order): Diagnosis Information    Diagnosis  788.30 (ICD-9-CM) - R32 (ICD-10-CM) - Urinary incontinence, unspecified type         SUBJECTIVE                                                                                                               Patient states she is doing well. Denied any cramping or soreness after last session. She hasn't been doing her exercise as much. She still notes some bladder leaking with sneezing.  Functional Change: She doesn't feel the need to rush as much to get to the restroom.   Current Functional Limitations: Still noting some bladder leaking symptoms      OBJECTIVE                                                                                                                    Observation   Needs cuing for coordination of pelvic floor with stabilization tasks.                      Treatment     Manual Therapy   Soft tissue mobilization to anterior regions of the pelvic floor until softening noted - defer today    Neuromuscular Re-Education  * cuing for sequencing, recruitment and performance    - Diaphragmatic breathing with pelvic floor and Transverse abdominis recruitment on exhale x 5  - pelvic floor and Transverse abdominis recruitment with:       Hip adduction x 10       Marching x 10       Bent knee fall out x 10   - bridges x 20  - seated pelvic floor recruitment with:      Hip adduction x 10      Marching x 10  - seated on ball:       Bouncing x 1 minute       Marching x 20       Anterior/posterior pelvic tilts x 20       Lateral pelvic tilts  20       Pelvic circles x 10 each way       Skilled input: tactile instruction/cues, demonstration, verbal instruction/cues and as detailed above    Writer verbally educated and received verbal consent for hand placement, positioning of patient, and techniques to be performed  today from patient for clothing adjustments for techniques, hand placement and palpation for techniques and therapist position for techniques as described above and how they are pertinent to the patient's plan of care.  Home Exercise Program  Access Code: [email or web address]  URL: [email or web address]  Date: 05/24/2023  Prepared by: Tianna Dillon    Exercises  - Seated Diaphragmatic Breathing   - Supine Transversus Abdominis Bracing with Pelvic Floor Contraction  - 1 x daily - 10 reps - 5-10 seconds hold  - PF hip adduction  - 1 x daily - 10-20 reps - 5-10 second hold  - Bent Knee Fallouts  - 1 x daily - 10 reps  - Seated Pelvic Floor Contraction  - 1 x daily - 1 sets - 10 reps  - Seated Pelvic Floor Contraction with Isometric Hip Adduction  - 1 x daily - 1 sets - 10 reps  - Supine Pelvic Floor Stretch  - 1-2 x daily  - Modified Dennys Stretch  - 1-2 x daily - 1 minute hold  - Lower Trunk Rotations  - 1-2 x daily - 10 reps      ASSESSMENT                                                                                                          To date the patient has made gains as expected.  Patient will continue to benefit from skilled care as outlined.  Patient continues to have impairments and functional deficits as noted.    Patient is progressing well in physical therapy and noting some improvement in symptoms thus far. Patient has encountered different family circumstances which has hindered her ability to complete her home program thereby reducing her potential progress thus far. However, she has noticed some reduction in symptoms as she has made more attempts to engage in exercise more often in the past week. Tightness in the pelvic floor muscles has markedly reduced which could also be helping to reduce symptoms. Patient will benefit from continued physical therapy to aid progression of home program for further strengthening and improvement.   Education:   - Results of above outlined  education: Verbalizes understanding    PLAN                                                                                                                          Continue interventions established at initial evalution. and Extend frequency and duration per below.  Frequency / Duration  1 times per week tapering as patient progresses for 3 weeks for an estimated total of 3 visits    Suggestions for next session as indicated: Progress per plan of care      Goals  Cough and sneeze without leaking  Exercise without leaking  Manage urgency  Increase strength to 4/5   The above improvements in impairments to assist in obtaining goals listed below  Long Term Goals: to be met by end of plan of care  1. * patient will utilize appropriate urge management techniques to aid ability to get to the restroom without leaking or having and accident. Status: progressing/ongoing  2. * patient will walk desired distances without bladder leakage to aid completion of daily household chores. Status: progressing/ongoing  3. * patient will cough or sneeze without urine leakage 100% of the time. Status: progressing/ongoing  4. * patient will demonstrate increased strength of pelvic floor to 4/6/6 to aid symptom reduction with daily tasks.  Status: progressing/ongoing      Therapy procedure time and total treatment time can be found documented on the Time Entry flowsheet     BMI: BMI (kg/m2): 23.1 (11-01-22 @ 01:23)  HbA1c:   Glucose:   BP: 112/76 (11-09-22 @ 16:22) (106/73 - 132/87)  Lipid Panel:

## 2024-03-02 ENCOUNTER — EMERGENCY (EMERGENCY)
Facility: HOSPITAL | Age: 54
LOS: 1 days | Discharge: HOME | End: 2024-03-02
Attending: STUDENT IN AN ORGANIZED HEALTH CARE EDUCATION/TRAINING PROGRAM | Admitting: STUDENT IN AN ORGANIZED HEALTH CARE EDUCATION/TRAINING PROGRAM
Payer: MEDICARE

## 2024-03-02 VITALS
WEIGHT: 119.93 LBS | RESPIRATION RATE: 18 BRPM | OXYGEN SATURATION: 100 % | HEART RATE: 85 BPM | SYSTOLIC BLOOD PRESSURE: 118 MMHG | HEIGHT: 65 IN | TEMPERATURE: 97 F | DIASTOLIC BLOOD PRESSURE: 78 MMHG

## 2024-03-02 VITALS
OXYGEN SATURATION: 99 % | TEMPERATURE: 97 F | HEART RATE: 90 BPM | RESPIRATION RATE: 16 BRPM | DIASTOLIC BLOOD PRESSURE: 70 MMHG | SYSTOLIC BLOOD PRESSURE: 104 MMHG

## 2024-03-02 DIAGNOSIS — F20.9 SCHIZOPHRENIA, UNSPECIFIED: ICD-10-CM

## 2024-03-02 LAB
ALBUMIN SERPL ELPH-MCNC: 3.6 G/DL — SIGNIFICANT CHANGE UP (ref 3.3–5)
ALP SERPL-CCNC: 56 U/L — SIGNIFICANT CHANGE UP (ref 30–120)
ALT FLD-CCNC: 15 U/L — SIGNIFICANT CHANGE UP (ref 10–60)
ANION GAP SERPL CALC-SCNC: 10 MMOL/L — SIGNIFICANT CHANGE UP (ref 5–17)
APAP SERPL-MCNC: <1 UG/ML — LOW (ref 10–30)
AST SERPL-CCNC: 21 U/L — SIGNIFICANT CHANGE UP (ref 10–40)
BASOPHILS # BLD AUTO: 0.04 K/UL — SIGNIFICANT CHANGE UP (ref 0–0.2)
BASOPHILS NFR BLD AUTO: 0.5 % — SIGNIFICANT CHANGE UP (ref 0–2)
BILIRUB SERPL-MCNC: 0.2 MG/DL — SIGNIFICANT CHANGE UP (ref 0.2–1.2)
BUN SERPL-MCNC: 22 MG/DL — SIGNIFICANT CHANGE UP (ref 7–23)
CALCIUM SERPL-MCNC: 8.9 MG/DL — SIGNIFICANT CHANGE UP (ref 8.4–10.5)
CHLORIDE SERPL-SCNC: 101 MMOL/L — SIGNIFICANT CHANGE UP (ref 96–108)
CO2 SERPL-SCNC: 23 MMOL/L — SIGNIFICANT CHANGE UP (ref 22–31)
CREAT SERPL-MCNC: 0.78 MG/DL — SIGNIFICANT CHANGE UP (ref 0.5–1.3)
EGFR: 91 ML/MIN/1.73M2 — SIGNIFICANT CHANGE UP
EOSINOPHIL # BLD AUTO: 0.49 K/UL — SIGNIFICANT CHANGE UP (ref 0–0.5)
EOSINOPHIL NFR BLD AUTO: 6.6 % — HIGH (ref 0–6)
ETHANOL SERPL-MCNC: <3 MG/DL — SIGNIFICANT CHANGE UP (ref 0–3)
GLUCOSE SERPL-MCNC: 106 MG/DL — HIGH (ref 70–99)
HCG SERPL-ACNC: 1 MIU/ML — SIGNIFICANT CHANGE UP
HCT VFR BLD CALC: 32.9 % — LOW (ref 34.5–45)
HGB BLD-MCNC: 10.3 G/DL — LOW (ref 11.5–15.5)
IMM GRANULOCYTES NFR BLD AUTO: 0.1 % — SIGNIFICANT CHANGE UP (ref 0–0.9)
LYMPHOCYTES # BLD AUTO: 2.19 K/UL — SIGNIFICANT CHANGE UP (ref 1–3.3)
LYMPHOCYTES # BLD AUTO: 29.4 % — SIGNIFICANT CHANGE UP (ref 13–44)
MCHC RBC-ENTMCNC: 28.1 PG — SIGNIFICANT CHANGE UP (ref 27–34)
MCHC RBC-ENTMCNC: 31.3 GM/DL — LOW (ref 32–36)
MCV RBC AUTO: 89.9 FL — SIGNIFICANT CHANGE UP (ref 80–100)
MONOCYTES # BLD AUTO: 0.37 K/UL — SIGNIFICANT CHANGE UP (ref 0–0.9)
MONOCYTES NFR BLD AUTO: 5 % — SIGNIFICANT CHANGE UP (ref 2–14)
NEUTROPHILS # BLD AUTO: 4.35 K/UL — SIGNIFICANT CHANGE UP (ref 1.8–7.4)
NEUTROPHILS NFR BLD AUTO: 58.4 % — SIGNIFICANT CHANGE UP (ref 43–77)
NRBC # BLD: 0 /100 WBCS — SIGNIFICANT CHANGE UP (ref 0–0)
PLATELET # BLD AUTO: 404 K/UL — HIGH (ref 150–400)
POTASSIUM SERPL-MCNC: 4.2 MMOL/L — SIGNIFICANT CHANGE UP (ref 3.5–5.3)
POTASSIUM SERPL-SCNC: 4.2 MMOL/L — SIGNIFICANT CHANGE UP (ref 3.5–5.3)
PROT SERPL-MCNC: 8.5 G/DL — HIGH (ref 6–8.3)
RBC # BLD: 3.66 M/UL — LOW (ref 3.8–5.2)
RBC # FLD: 13.1 % — SIGNIFICANT CHANGE UP (ref 10.3–14.5)
SALICYLATES SERPL-MCNC: 0.7 MG/DL — LOW (ref 2.8–20)
SODIUM SERPL-SCNC: 134 MMOL/L — LOW (ref 135–145)
TSH SERPL-MCNC: 1.66 UIU/ML — SIGNIFICANT CHANGE UP (ref 0.27–4.2)
WBC # BLD: 7.45 K/UL — SIGNIFICANT CHANGE UP (ref 3.8–10.5)
WBC # FLD AUTO: 7.45 K/UL — SIGNIFICANT CHANGE UP (ref 3.8–10.5)

## 2024-03-02 PROCEDURE — 99285 EMERGENCY DEPT VISIT HI MDM: CPT

## 2024-03-02 PROCEDURE — 36415 COLL VENOUS BLD VENIPUNCTURE: CPT

## 2024-03-02 PROCEDURE — 80307 DRUG TEST PRSMV CHEM ANLYZR: CPT

## 2024-03-02 PROCEDURE — 80053 COMPREHEN METABOLIC PANEL: CPT

## 2024-03-02 PROCEDURE — 90792 PSYCH DIAG EVAL W/MED SRVCS: CPT | Mod: 2W

## 2024-03-02 PROCEDURE — 85025 COMPLETE CBC W/AUTO DIFF WBC: CPT

## 2024-03-02 PROCEDURE — 84443 ASSAY THYROID STIM HORMONE: CPT

## 2024-03-02 PROCEDURE — 84702 CHORIONIC GONADOTROPIN TEST: CPT

## 2024-03-02 NOTE — ED PROVIDER NOTE - CLINICAL SUMMARY MEDICAL DECISION MAKING FREE TEXT BOX
53 year old female p/w AMS, disorientation.  Patient BIBA, found by police talking to a telephone pole.  Endorses psychiatric history, denies SI/HI, hallucinations.  Check labs, medical clearance, BAL, telepsychiatry consult

## 2024-03-02 NOTE — ED PROVIDER NOTE - DIFFERENTIAL DIAGNOSIS
Differential Diagnosis Ddx includes but not limited to bipolar d/o, schizophrenia, schizoaffective disorder, SI, personality disorder, alcohol intoxication, substance abuse

## 2024-03-02 NOTE — ED PROVIDER NOTE - OBJECTIVE STATEMENT
53-year-old female with unknown PMH presents with disorientation, altered mental status.  Patient BIBA.  History provided by EMS.  Patient was picked up by the police department.  She was found on the street talking to a telephone pole and told police that the telephone pole with her boyfriend.  Patient states that she resides in part-time supportive housing in [place].  She took the bus to [place] to  a friend.  Patient denies illicit drug use, alcohol use, smoking.  Patient denies SI/HI, hearing voices, visual/auditory hallucinations.  Patient nonsensical and times tangential, difficult to obtain appropriate history.  No PMD. 53-year-old female with unknown PMH presents with disorientation, altered mental status.  Patient BIBA.  History provided by EMS.  Patient was picked up by the police department.  She was found on the street talking to a telephone pole and told police that the telephone pole with her boyfriend.  Patient states that she resides in part-time supportive housing in [place].  She took the bus to [place] to  a friend.  Patient denies illicit drug use, alcohol use, smoking.  Patient denies SI/HI, hearing voices, visual/auditory hallucinations.  Patient nonsensical and times tangential, difficult to obtain appropriate history.  States she has a psychiatric history but is not able to give further details.  Endorses that she takes Depakote daily, unknown dose. No PMD.

## 2024-03-02 NOTE — ED ADULT NURSE NOTE - OBJECTIVE STATEMENT
BIB by EMS for altered mental status. Unknown baseline. found talking to a telephone pole by PD. hx psych, unable to determine psych diagnosis. Pt states lives in group home in [place] and took a bus to [place], then "I walked to [place]." Pt cooperative, speech clear, but altered. Answers correctly to name, location and , but then answers incorrectly to all other questions going from subject to subject w/out making sense. Denies suicidal or homicidal thoughts. Clothes dirty and smells of urine.

## 2024-03-02 NOTE — ED ADULT TRIAGE NOTE - CHIEF COMPLAINT QUOTE
found talking to a telephone pole by PD. hx psych, anemia. states lives in group home in [place] and walked to [place].

## 2024-03-02 NOTE — ED PROVIDER NOTE - PROGRESS NOTE DETAILS
Case d/w telepsych attending Dr. Sánchez who states patient is coherent with linear thought process.  She does not require admission and is not a danger to herself or others.  There are no acute safety concerns. Safe for d/c.    Patient has money in her wallet.  She is requesting a taxi to a bus stop to return to her housing in [place].

## 2024-03-02 NOTE — ED BEHAVIORAL HEALTH ASSESSMENT NOTE - VETERAN
[facility]    No

## 2024-03-02 NOTE — ED BEHAVIORAL HEALTH ASSESSMENT NOTE - RISK ASSESSMENT
Patient at low risk for self harm. Risk factors include psychiatric history, poor social supports. Protective factors which help mitigate this risk include no active suicidal ideation, no known suicide attempt, patient future oriented, denies substance use, reports residential stability, has access to care.

## 2024-03-02 NOTE — ED BEHAVIORAL HEALTH ASSESSMENT NOTE - SUMMARY
Patient is a 54 yo AAF, single, non-caregiver, unemployed on disability, believed to be living in supportive housing, pphx of some psychotic disorder, reports being in active treatment, denies active substance use, no other pertinent history. Patient BIBA after found acting bizarre in public.    On assessment, patient is calm, cooperative, fairly linear and goal directed, although limited historian. Patient does not present acutely depressed, anxious, manic or psychotic. She denies any SI/SA/HI/HA. There are no acute safety concerns that require immediate interventions. Patient can be safely discharged.

## 2024-03-02 NOTE — ED BEHAVIORAL HEALTH ASSESSMENT NOTE - HPI (INCLUDE ILLNESS QUALITY, SEVERITY, DURATION, TIMING, CONTEXT, MODIFYING FACTORS, ASSOCIATED SIGNS AND SYMPTOMS)
Patient is a 54 yo AAF, single, non-caregiver, unemployed on disability, believed to be living in supportive housing, pphx of some psychotic disorder, reports being in active treatment, denies active substance use, no other pertinent history. Patient BIBA after found acting bizarre in public.    At the start of telehealth evaluation, patient first observed to be sleeping in hospital bed. Patient responds appropriately to being awakened by the 1:1 at bedside. Patient is calm, cooperative and fairly linear through evaluation.     Patient believes she was brought to the emergency room to check on her anemia levels (on review of her blood work hemoglobin is below normal levels). Patient acknowledges that she lives in [place], [place]. Was able to provide a number for [employer] (an agency that assists with supportive housing) and the address where she supposedly resides. States that she was in [place] to  a friend but they were "taking to long". Patient denies any acute complaints. Informs that she takes the following medication: Depakote, Cogentin and Risperidone. Is unable to verify the dosages. Reports that she "feels good". Enjoys daily walks and reading the bible. Denies any acute psychiatric symptoms. Denies suicidal ideation or homicidal ideation. Patient acknowledges that she would like to be discharged and plans to return to residence.

## 2024-03-02 NOTE — ED PROVIDER NOTE - CONSTITUTIONAL, MLM
normal... Disheveled, malodorous, awake, alert, oriented to person, place, time/situation and in no apparent distress.

## 2024-03-02 NOTE — ED BEHAVIORAL HEALTH ASSESSMENT NOTE - OTHER
reports with aunts, uncles and roommates poor, reportedly malodorous supportive housing - [facility]

## 2024-03-02 NOTE — ED PROVIDER NOTE - NSFOLLOWUPCLINICS_GEN_ALL_ED_FT
[facility]  Psychiatry  [address] [place] [address]  Phone: [phone]  Fax:     [facility]  Psychiatry  [address] [place] [address]  Phone: [phone]  Fax:

## 2024-03-02 NOTE — ED ADULT NURSE NOTE - CHIEF COMPLAINT QUOTE
found talking to a telephone pole by PD. hx psych, anemia. states lives in group home in [place] and walked to [place].

## 2024-03-02 NOTE — ED PROVIDER NOTE - NSFOLLOWUPINSTRUCTIONS_ED_ALL_ED_FT
Please follow up with your doctors and psychiatry.  Return to the ER for persistent fever, confusion, altered mental status, suicidal ideation, hallucinations, or any other concerns. Please follow up with your doctors and psychiatry.  Return to the ER for persistent fever, confusion, altered mental status, suicidal ideation, hallucinations, or any other concerns.      Confusion    Confusion is the inability to think with the usual speed or clarity. People who are confused often describe their thinking as cloudy or unclear. Confusion can also include feeling disoriented. This means you are unaware of where you are or who you are. You may also not know the date or time. When confused, you may have difficulty remembering, paying attention, or making decisions. Some people also act aggressively when they are confused.    In some cases, confusion may come on quickly. In other cases, it may develop slowly over time. How quickly confusion comes on depends on the cause.    Confusion may be caused by:    Head injury (concussion).  Seizures.  Stroke.  Fever.  Brain tumor.  Decrease in brain function due to a vascular or neurologic condition (dementia).  Emotions, like rage or terror.  Inability to know what is real and what is not (hallucinations).  Infections, such as a urinary tract infection (UTI).  Using too much alcohol, drugs, or medicines.  Loss of fluid (dehydration) or an imbalance of salts in the body (electrolytes).  Lack of sleep.  Low blood sugar (diabetes).  Low levels of oxygen. This comes from conditions such as chronic lung disorders.  Side effects of medicines, or taking medicines that affect other medicines (drug interactions).  Lack of certain nutrients, especially niacin, thiamine, vitamin C, or vitamin B.  Sudden drop in body temperature (hypothermia).  Change in routine, such as traveling or being hospitalized.    Follow these instructions at home:  Pay attention to your symptoms. Tell your health care provider about any changes or if you develop new symptoms. Follow these instructions to control or treat symptoms. Ask a family member or friend for help if needed.        Medicines    Take over-the-counter and prescription medicines only as told by your health care provider.  Ask your health care provider about changing or stopping any medicines that may be causing your confusion.  Avoid pain medicines or sleep medicines until you have fully recovered.  Use a pillbox or an alarm to help you take the right medicines at the right time.        Lifestyle     Eat a balanced diet that includes fruits and vegetables.  Get enough sleep. For most adults, this is 7–9 hours each night.  Do not drink alcohol.  Do not become isolated. Spend time with other people and make plans for your days.  Do not drive until your health care provider says that it is safe to do so.  Do not use any products that contain nicotine or tobacco, such as cigarettes and e-cigarettes. If you need help quitting, ask your health care provider.  Stop other activities that may increase your chances of getting hurt. These may include some work duties, sports activities, swimming, or bike riding. Ask your health care provider what activities are safe for you.        What caregivers can do    Find out if the person is confused. Ask the person to state his or her name, age, and the date. If the person is unsure or answers incorrectly, he or she may be confused.  Always introduce yourself, no matter how well the person knows you.  Remind the person of his or her location. Do this often.  Place a calendar and clock near the person who is confused.  Talk about current events and plans for the day.  Keep the environment calm, quiet, and peaceful.  Help the person do the things that he or she is unable to do. These include:    Taking medicines.   Keeping follow-up visits with his or her health care provider.  Helping with household duties, including meal preparation.  Running errands.  Get help if you need it. There are several support groups for caregivers.  If the person you are helping needs more support, consider day care, extended care programs, or a skilled nursing facility. The person's health care provider may be able to help evaluate these options.        General instructions    Monitor yourself for any conditions you may have. These may include:    Checking your blood glucose levels, if you have diabetes.  Watching your weight, if you are overweight.  Monitoring your blood pressure, if you have hypertension.  Monitoring your body temperature, if you have a fever.  Keep all follow-up visits as told by your health care provider. This is important.    Contact a health care provider if:  Your symptoms get worse.    Get help right away if you:  Feel that you are not able to care for yourself.  Develop severe headaches, repeated vomiting, seizures, blackouts, or slurred speech.  Have increasing confusion, weakness, numbness, restlessness, or personality changes.  Develop a loss of balance, have marked dizziness, feel uncoordinated, or fall.  Develop severe anxiety, or you have delusions or hallucinations.    These symptoms may represent a serious problem that is an emergency. Do not wait to see if the symptoms will go away. Get medical help right away. Call your local emergency services (911 in the [place] Do not drive yourself to the hospital.    Summary  Confusion is the inability to think with the usual speed or clarity. People who are confused often describe their thinking as cloudy or unclear.  Confusion can also include having difficulty remembering, paying attention, or making decisions.  Confusion may come on quickly or develop slowly over time, depending on the cause. There are many different causes of confusion.  Ask for help from family members or friends if you are unable to take care of yourself.    ADDITIONAL NOTES AND INSTRUCTIONS    Please follow up with your Primary MD in 24-48 hr.  Seek immediate medical care for any new/worsening signs or symptoms.

## 2024-03-02 NOTE — ED PROVIDER NOTE - NEUROLOGICAL, MLM
Alert and oriented, no focal deficits, no motor or sensory deficits.  Answers questions appropriately then starts to speak tangential and nonsensical. Denies SI/HI

## 2024-03-02 NOTE — ED PROVIDER NOTE - PATIENT PORTAL LINK FT
You can access the FollowMyHealth Patient Portal offered by [facility] by registering at the following website: [email or web address]. By joining [employer]’s FollowMyHealth portal, you will also be able to view your health information using other applications (apps) compatible with our system.

## 2024-03-02 NOTE — ED BEHAVIORAL HEALTH ASSESSMENT NOTE - DESCRIPTION
unable to assess see HPI calm and cooperative   ICU Vital Signs Last 24 Hrs  T(C): 36.8 (02 Mar 2024 06:01), Max: 36.8 (02 Mar 2024 06:01)  T(F): 98.3 (02 Mar 2024 06:01), Max: 98.3 (02 Mar 2024 06:01)  HR: 82 (02 Mar 2024 06:01) (82 - 85)  BP: 101/50 (02 Mar 2024 06:01) (101/50 - 118/78)  BP(mean): --  ABP: --  ABP(mean): --  RR: 16 (02 Mar 2024 06:01) (16 - 18)  SpO2: 100% (02 Mar 2024 06:01) (100% - 100%)    O2 Parameters below as of 02 Mar 2024 06:01  Patient On (Oxygen Delivery Method): room air

## 2024-03-04 PROBLEM — D64.9 ANEMIA, UNSPECIFIED: Chronic | Status: ACTIVE | Noted: 2022-11-02

## 2024-03-04 PROBLEM — N39.3 STRESS INCONTINENCE (FEMALE) (MALE): Chronic | Status: ACTIVE | Noted: 2022-12-02

## 2024-03-04 RX ORDER — DIVALPROEX SODIUM 500 MG/1
0 TABLET, DELAYED RELEASE ORAL
Refills: 0 | DISCHARGE

## 2024-07-02 NOTE — BH DISCHARGE NOTE NURSING/SOCIAL WORK/PSYCH REHAB - PATIENT PORTAL LINK FT
Pt d/c home with AVS no ss of distress script x 1 sent to the pharmacy, ptss gait steady at d/c  
Pt is able to tolerate doing I .S  
You can access the FollowMyHealth Patient Portal offered by [facility] by registering at the following website: [email or web address]. By joining [facility]’s FollowMyHealth portal, you will also be able to view your health information using other applications (apps) compatible with our system.

## 2024-08-30 NOTE — BH INPATIENT PSYCHIATRY ASSESSMENT NOTE - NSBHATTESTTYPEVISIT_PSY_A_CORE
----- Message from Nancy MATOS RN sent at 8/30/2024 10:48 AM CDT -----  Regarding: LAAO follow up imaging  Hello,    It appears that this patient had their LAAO device implanted on 3/1/24.    It is common for the patient to have some follow up imaging done of their LAAO before the end of the 6 month follow period which ends 10/27/24.    This is being sent to your team as no orders for follow up imaging have been noted post-implant.     If you and your physician feel it is appropriate for this patient to get follow up imaging at this point, please place order for the imaging.     Additionally, to help facilitate scheduling and imaging completion, if it is a follow up DESI, please contact Jeri Martell and Roseline Esteves, DESI schedulers at [facility], so they may reach out to the patient to schedule by the end of the window if possible. If it cannot be done before the end of the window, that is okay.    If it is a CT you are ordering instead, please follow the usual process to assist patient in scheduling.        However if you and your physician decide to delay follow up LAAO imaging, then please let me know via staff message.      Thank you!    Nancy \"Tamiko\" Stanton, JESSIKA, RN  Cardiac Care Coordinator - LAAO Program  
6 month follow up would be 9/1 as LAAO occurred 3/1/24. Slightly elevated creat, plan for DESI. Will route to our schedulers.  
On-site Attending supervising PANCHITO (99XXX codes)

## 2024-09-13 NOTE — BH SOCIAL WORK INITIAL PSYCHOSOCIAL EVALUATION - PATIENT REPRESENTATIVE: ( YOU CAN CHOOSE ANY PERSON THAT CAN ASSIST YOU WITH YOUR HEALTH CARE PREFERENCES, DOES NOT HAVE TO BE A SPOUSE, IMMEDIATE FAMILY OR SIGNIFICANT OTHER/PARTNER)
START TAKING THE ESCITALOPRAM 20MG EVERY SINGLE NIGHT AT BEDTIME FOR ANXIETY AND MOOD. THIS WILL HELP YOU STOP NERVOUSLY BITING YOUR FINGERNAILS.     YOU CAN ALSO TAKE THE ALPRAZOLAM THAT DR HARE PRESCRIBED AT BEDTIME AND THIS WILL HELP YOU FALL ASLEEP. TAKE IT ABOUT AN HOUR BEFORE YOU WOULD LIKE TO FALL ASLEEP.   
same name as above

## 2025-05-05 NOTE — ED BEHAVIORAL HEALTH ASSESSMENT NOTE - NSCURPASTPSYDX_PSY_ALL_CORE
PRE-SEDATION ASSESSMENT    CONSENT  Risks, benefits, and alternatives have been discussed with the patient/patient representative, and patient/patient representative agrees to proceed: Yes    MEDICAL HISTORY       PHYSICAL EXAM  History and Physical Reviewed: Patient has valid H&P within 30 days. I have reviewed and there are no changes.  Airway Risk History: No previous complications  Airway Anatomy : Class II  Heart : Normal  Lungs : Normal  LOC/Mental Status : Normal    OTHER FINDINGS  Reviewed current medications and allergies: Yes  Pertinent lab/diagnostic test reviewed: Yes    SEDATION RISK ASSESSMENT  Risk Status ASA: Class II - Normal patient with mild systemic disease  Plan for Sedation: Moderate Sedation  Indications for Procedure/Pre-Procedure Diagnosis and Planned Procedure: Renal transplant presenting for biopsy  EKG Monitoring: Yes    NARRATIVE FINDINGS      Psychotic disorder

## 2025-06-13 NOTE — BH INPATIENT PSYCHIATRY PROGRESS NOTE - CURRENT MEDICATION
Please advise if cardiac monitor has been read yet.  Thank you!    Route to [facility] cardiology pool   MEDICATIONS  (STANDING):  diVALproex  milliGRAM(s) Oral two times a day  risperiDONE   Tablet 2 milliGRAM(s) Oral at bedtime  risperiDONE   Tablet 1 milliGRAM(s) Oral daily    MEDICATIONS  (PRN):  diphenhydrAMINE 50 milliGRAM(s) Oral every 6 hours PRN Rash and/or Itching  haloperidol     Tablet 5 milliGRAM(s) Oral every 6 hours PRN agitation  hydrOXYzine hydrochloride 50 milliGRAM(s) Oral every 6 hours PRN Anxiety  LORazepam     Tablet 2 milliGRAM(s) Oral every 6 hours PRN Agitation   MEDICATIONS  (STANDING):  diVALproex  milliGRAM(s) Oral two times a day  risperiDONE   Tablet 2 milliGRAM(s) Oral daily  risperiDONE   Tablet 3 milliGRAM(s) Oral at bedtime    MEDICATIONS  (PRN):  diphenhydrAMINE 50 milliGRAM(s) Oral every 6 hours PRN Rash and/or Itching  haloperidol     Tablet 5 milliGRAM(s) Oral every 6 hours PRN agitation  hydrOXYzine hydrochloride 50 milliGRAM(s) Oral every 6 hours PRN Anxiety

## 2025-07-15 NOTE — BH DISCHARGE NOTE NURSING/SOCIAL WORK/PSYCH REHAB - HISTORY OF COVID-19 VACCINATION
No Patient's daughter was told that the patient has CKD but patient never experienced any problems with urination and has appropriate urine output.  On admission: Cr 1.59, , eGFR 30  Now: Cr .87 BUN 34, eGFR 63    - RAFFI resolved